# Patient Record
(demographics unavailable — no encounter records)

---

## 2024-10-01 NOTE — DISEASE MANAGEMENT
[Clinical] : TNM Stage: c [FreeTextEntry4] : cervical cancer, squamous cell [TTNM] : 1b3 [NTNM] : 0 [MTNM] : 0 [IB] : IB

## 2024-10-01 NOTE — HISTORY OF PRESENT ILLNESS
[FreeTextEntry1] : 72 year old woman returns today s/p 5,040cGy to the pelvis completed 12/5/23 and 2,800cGy T & R completed 12/6/23 with weekly Cisplatin for a [id number] lB SCCA of the cervix.  Interval history: She met with Caroline Abdul 10/2/24   vaginal pruritus, vaginal discharge, edema, change in bowel, change in urination, pelvic pain/cramping, breast pain, fatigue or weight loss.  Care team: Gyn Onc Lyndon Moyer Med Onc Natasha Ramirez: Anastrozole  PCP Kevin Humphreys Breast surgeon Caroline Abdul OR? med onc Rufus Lee

## 2024-10-25 NOTE — END OF VISIT
[FreeTextEntry3] : Follow up with Dr. Garcia for exam Follow up with pelvic exam in my office in 3 months and follow up PET CT Follow up with Dr. Ramirez for breast FDG activity work up [Time Spent: ___ minutes] : I have spent [unfilled] minutes of time on the encounter which excludes teaching and separately reported services.

## 2024-10-25 NOTE — VITALS
[Maximal Pain Intensity: 1/10] : 1/10 [Least Pain Intensity: 0/10] : 0/10 [Pain Description/Quality: ___] : Pain description/quality: [unfilled] [Pain Duration: ___] : Pain duration: [unfilled] [Pain Location: ___] : Pain Location: [unfilled] [Pain Interferes with ADLs] : Pain does not interfere with activities of daily living [80: Normal activity with effort; some signs or symptoms of disease.] : 80: Normal activity with effort; some signs or symptoms of disease.

## 2024-10-25 NOTE — REVIEW OF SYSTEMS
[Negative] : Allergic/Immunologic [Constipation: Grade 0] : Constipation: Grade 0 [Diarrhea: Grade 0] : Diarrhea: Grade 0 [Edema Limbs: Grade 0] : Edema Limbs: Grade 0  [Fatigue: Grade 0] : Fatigue: Grade 0 [Localized Edema: Grade 0] : Localized Edema: Grade 0  [Neck Edema: Grade 0] : Neck Edema: Grade 0 [Hematuria: Grade 0] : Hematuria: Grade 0 [Urinary Incontinence: Grade 1 - Occasional (e.g., with coughing, sneezing, etc.), pads not indicated] : Urinary Incontinence: Grade 1 - Occasional (e.g., with coughing, sneezing, etc.), pads not indicated [Urinary Retention: Grade 0] : Urinary Retention: Grade 0 [Urinary Tract Pain: Grade 0] : Urinary Tract Pain: Grade 0 [Urinary Urgency: Grade 0] : Urinary Urgency: Grade 0 [Urinary Frequency: Grade 0] : Urinary Frequency: Grade 0

## 2024-10-25 NOTE — HISTORY OF PRESENT ILLNESS
[FreeTextEntry1] : 72-year-old woman with cervical cancer and breast cancer.  She underwent definitive chemoradiation for cervical cancer, with EBRT plus intracavitary brachytherapy, completed 12/6/2023.  Started neoadjuvant endocrine therapy for breast cancer in 10/2023.  Interval history: 8/22/2024 left breast ultrasound showed a 1.7 cm irregular mass in the left breast 3:00 1 cm FN, and a 2.8 cm abnormal appearing axillary lymph node.  No significant sonographic change in both these findings compared to before.    10/11/2024 PET/CT scan showed focal uptake at the left aspect of the cervix (SUV 3.7, previously 4.0), with no uterine masses or hypermetabolic abnormalities.  No abdominal pelvic lymph nodes.  Unchanged left axillary/breast soft tissue density with mild uptake.    She has noted some urinary stress incontinence, but otherwise stable health.  No vaginal pruritus.  She is using a vaginal dilator, and notes occasional spotting after dilator use.  Care team: Gyn Onc Lyndon Moyer Med Onc Natasha Ramirez PCP Kevin Humphreys Breast surgeon Caroline Abdul

## 2024-10-25 NOTE — ASSESSMENT
[FreeTextEntry1] : Pt is a 73 yo with Stage Ib3 squamous cell carcinoma s/p chemo-radiation treatment. Patient is s/p PEUA Cervical biopsy and ECC on 8/28/23. PET CT on 10/11/24 with stable findings with FDG activity of 3.7 in left part of the cervix clinically patient asymptomatic and relatively decreased activity compared to April and June study. I do not think that MRI follow up is required at this time and this is consistent with inflammation causing this activity. If there is increase FDG activity or any new symptoms would consider repeat MRI, but for now just continue PET CT follow up.  She also needs to follow up with Dr. Ramirez for breast work up.   She has follow up with Dr. Garcia today to make sure he agrees with plan. Continue surveillance visits and pelvic exams every 3 months.

## 2024-10-25 NOTE — HISTORY OF PRESENT ILLNESS
[Home] : at home, [unfilled] , at the time of the visit. [Other Location: e.g. Home (Enter Location, City,State)___] : at [unfilled] [Verbal consent obtained from patient] : the patient, [unfilled] [FreeTextEntry1] : Pt is a 73 yo with Stage Ib3 squamous cell carcinoma s/p chemo-radiation treatment. Patient is s/p PEUA Cervical biopsy and ECC on 8/28/23. Pt had completed treatment and had residual FDG activity in cervix 4/2024 of 4.2. Follow up MRI without focal lesions at that time and had stable activity in the area on 3 month follow up as recommended by TB. She had last PET CT done 10/11/2024 and presents to review and discuss.   Indication: transportation issue  Interval history: Patient reports no vaginal bleeding, no pelvic pain and no new symptoms she remains asymptomatic.

## 2024-10-25 NOTE — PHYSICAL EXAM
[Normal] : oriented to person, place and time, the affect was normal, the mood was normal and not anxious [Extraocular Movements] : extraocular movements were intact [Outer Ear] : the ears and nose were normal in appearance [Normal] : normal external genitalia [de-identified] : Examination performed in the presence of a female chaperone.  Treatment related changes, with narrowing of the upper vagina.  No mass lesions appreciable.  No tenderness to palpation.

## 2024-10-25 NOTE — PHYSICAL EXAM
[Normal] : oriented to person, place and time, the affect was normal, the mood was normal and not anxious [Extraocular Movements] : extraocular movements were intact [Outer Ear] : the ears and nose were normal in appearance [Normal] : normal external genitalia [de-identified] : Examination performed in the presence of a female chaperone.  Treatment related changes, with narrowing of the upper vagina.  No mass lesions appreciable.  No tenderness to palpation.

## 2024-11-13 NOTE — HISTORY OF PRESENT ILLNESS
[FreeTextEntry1] : Pt is a 71 y/o F who presents today for initial evaluation.  Pt has PMH CAD s/p PCI RCA 2004 at [facility] (arm pain, DERAS), HLD, HTN, breast Ca dx 07/2023 cervical Ca s/p cisplatin (but could not tolerate) and RXT  Pt is planning to have lumpectomy at [place] Dr Abdul She is feeling well overall - denies CP, SOB, diaphoresis, palpitations, dizziness, syncope, LE edema, PND, orthopnea.  Pt is accompanied by her    PCP Dr Kevin Rosa Previous surgeries: carpal tunnel - no problems with anesthesia Smoking status: quit 2014 no ETOH no drug use Current exercise: none Daily water intake: 50 oz Daily caffeine intake: 1 cup coffee OTC medications: vit D

## 2024-11-13 NOTE — DISCUSSION/SUMMARY
[EKG obtained to assist in diagnosis and management of assessed problem(s)] : EKG obtained to assist in diagnosis and management of assessed problem(s) [FreeTextEntry1] : Pt is a 71 y/o F who presents today for initial evaluation.  Pt has PMH CAD s/p PCI RCA 2004 at [facility] (arm pain, DERAS), HLD, HTN, breast Ca dx 07/2023 cervical Ca s/p cisplatin (but could not tolerate) and RXT  Pt is planning to have lumpectomy at [place] Dr Abdul  Will check transthoracic echocardiogram to evaluate left ventricular function and assess for any structural abnormalities  check nuc stress test to eval for ischemia Further preop recommendations will be made once diagnostic testing is complete.   CAD: pt asymptomatic unknown LV function Will check transthoracic echocardiogram to evaluate left ventricular function and assess for any structural abnormalities  c/w ASA 81mg qd c/w statin, goal LDL <70 check labs We discussed the importance of aggressive risk factor modification, including continuing medications as directed, following a healthy diet of unprocessed, low saturated fat and carbohydrate diet as close to a plant based or Mediterranean as possible, regular exercise at least 30 minutes of cardiovascular exercise daily. Also advised to report any symptoms immediately.  HTN: well controlled c/w atenolol 50mg qd Discussed the long-term health risks of uncontrolled BP.  Advised low salt diet, regular exercise, maintaining ideal weight. Encouraged pt to check BP at home and keep journal   Pt will return in 6 mnths or sooner as needed but I encouraged communication via phone or portal if necessary.  We will call pt with test results when applicable and arrange for expedited follow up if necessary.  The described plan was discussed with the pt.  All questions and concerns were addressed to the best of my knowledge.

## 2024-11-13 NOTE — PHYSICAL EXAM
LM for patient informing her that her urine is positive for infection.  Per Leigh GONZALEZ, please treat with macrobid 100mg po bid x 10 days starting tomorrow (we want her last day to be 5/17 when she has her urodynamics as rescheduled).  Script sent to [facility] pharmacy. Told patient to call office back to confirm that she received our message.       [Well Developed] : well developed [Well Nourished] : well nourished [No Acute Distress] : no acute distress [Normal Conjunctiva] : normal conjunctiva [Normal Venous Pressure] : normal venous pressure [No Carotid Bruit] : no carotid bruit [Normal S1, S2] : normal S1, S2 [No Murmur] : no murmur [No Rub] : no rub [No Gallop] : no gallop [Clear Lung Fields] : clear lung fields [Good Air Entry] : good air entry [No Respiratory Distress] : no respiratory distress  [Soft] : abdomen soft [Non Tender] : non-tender [No Masses/organomegaly] : no masses/organomegaly [Normal Bowel Sounds] : normal bowel sounds [Normal Gait] : normal gait [No Edema] : no edema [No Cyanosis] : no cyanosis [No Clubbing] : no clubbing [No Varicosities] : no varicosities [No Rash] : no rash [No Skin Lesions] : no skin lesions [Moves all extremities] : moves all extremities [No Focal Deficits] : no focal deficits [Normal Speech] : normal speech [Alert and Oriented] : alert and oriented [Normal memory] : normal memory

## 2024-11-19 NOTE — CONSULT LETTER
[Dear  ___] : Dear  [unfilled], [Consult Letter:] : I had the pleasure of evaluating your patient, [unfilled]. [Please see my note below.] : Please see my note below. [Consult Closing:] : Thank you very much for allowing me to participate in the care of this patient.  If you have any questions, please do not hesitate to contact me. [Sincerely,] : Sincerely, [FreeTextEntry3] : Natasha Ramirez MD Medical Oncology/Hematology [facility] [place], [facility]   [facility] [employer] [facility]

## 2024-11-19 NOTE — HISTORY OF PRESENT ILLNESS
[de-identified] : Ms. Vega is a 72 year old female PMH cervical cancer, currently undergoing concurrent chemotherapy with weekly Cisplatin and RT, who is diagnosed with a synchronous left breast invasive lobular carcinoma ER 95% LA 05% HER2 0 Negative and DCIS with metastatic left axillary invasive carcinoma consistent with lobular carcinoma. cT1N1.   Patient obtained staging PET/CT on 9/20/23 for cervical cancer  - demonstrating Diffusely increased activity in the prominent cervix compatible with biopsied malignancy. No hypermetabolic pelvic sidewall or retroperitoneal nodes. Prominent elongated left axillary node measures 3.8 x 1.1 cm with SUV 3.   09/29/2023 Diagnostic bilateral mammo and left breast sono- There are scattered areas of fibroglandular density. No suspicious mass, suspicious microcalcifications, or other sign of malignancy is identified in the right breast.Indeterminate asymmetry is present in the lateral anterior left breast.A prominent left axillary lymph node is noted. US-The breast parenchyma demonstrates a heterogeneous background echotexture (mixed fatty and fibroglandular). Impression- Suspicious left breast finding at 3:00, 1 cm from the nipple, as well as the left axilla, ultrasound-guided biopsy of both is recommended.  10/9/23 Underwent left breast and left axillary US guided core biopsy  1. Left breast 3:00 1cmfn US guided core biopsy- Invasive lobular carcinoma, ER 95% LA 05% HER2 0 Negative, classic type, measure 14mm, lobular carcinoma in situ and DCIS, microcalficiatons are present in LCIS, lymphovascular permeation by  tumor is not seen 2. Left axillary lymph node US guided core biopsy- Invasive carcinoma consistent with lobular carcinoma metastatic lymph node, measures 14mm, extracapsular extension is not present     Patient presents for initial visit.  Notes receiving 2 cycle's of Cisplatin, notes Cisplatin this week was cancelled due to elevated Creatinine, s/p [facility] admission for 2 days for hydration, pending follow up for further planning with Dr Lee  Undergoing RT for cervical cancer Has left breast pain with bruising and left nipple retraction since breast biopsy.  Notes never obtaining mammogram prior to 9/2023  [de-identified] : Pt presents for follow up with  Pt reports doing well overall Pending lumpectomy, has cards clearance pending

## 2024-11-19 NOTE — BEGINNING OF VISIT
[Medical reason not done] : Medical reason not done [0] : 2) Feeling down, depressed, or hopeless: Not at all (0) [Former] : Former [Date Discussed (MM/DD/YY): ___] : Discussed: [unfilled] [Patient/Caregiver not ready to engage] : Patient/Caregiver not ready to engage [[id number]] : 0 [Pain Scale: ___] : On a scale of 1-10, today the patient's pain is a(n) [unfilled].

## 2024-11-19 NOTE — CONSULT LETTER
[Dear  ___] : Dear  [unfilled], [Consult Letter:] : I had the pleasure of evaluating your patient, [unfilled]. [Please see my note below.] : Please see my note below. [Consult Closing:] : Thank you very much for allowing me to participate in the care of this patient.  If you have any questions, please do not hesitate to contact me. [Sincerely,] : Sincerely, [FreeTextEntry3] : Natasha Ramirez MD Medical Oncology/Hematology [facility] [place], [facility]   [facility] [employer] [facility]

## 2024-11-19 NOTE — ADDENDUM
[FreeTextEntry1] : Documented by Isabel Diaz acting as scribe for Dr. Ramirez on  11/19/2024.    All Medical record entries made by the Scribe were at my, Dr. Ramirez's, direction and personally dictated by me on  11/19/2024. I have reviewed the chart and agree that the record accurately reflects my personal performance of the history, physical exam, assessment and plan. I have also personally directed, reviewed, and agreed with the discharge instructions.

## 2024-11-19 NOTE — ASSESSMENT
[FreeTextEntry1] : 72 year old female with synchronous cervical and breast cancer. She was undergoing treatment for stage IB squamous cell cervical cancer, CRT with weekly Cisplatin, diagnosed synchronous left breast invasive lobular carcinoma ER 95% WA 05% HER2 0 Negative metastatic to left axillary LN. cT1N1.  As she was undergoing treatment for cervical cancer, she started neoadjuvant endocrine therapy for breast cancer. Started Anastrozole 1 mg daily on 11/17/23. She completed RT for cervical cancer on 12/6/23. Oncotype Dx RS 2, no benefit from chemotherapy, and DRR 9% at 9 years with Lama or AI.   11/28/23 MRI breast -  irregular nonmass enhancement in the 3:00 axis spanning for up to 4.2 cm in AP dimension x 1.3 cm transverse, with associated biopsy marker, corresponding to newly diagnosed malignancy Enhancement extends into the nipple which demonstrates asymmetric enhancement. 2.8 cm enlarged left axillary lymph node with biopsy marker, biopsy-proven malignancy.  Case discussed at tumor board 2/28/24. 3/2024 US breast and PET with interval response in left axillary lymph node with similar appearance of left breast mass. Continue endocrine therapy. Patient desires breast conservation.  9/23/24 DEXA: Osteopenia 11/8/24 MR Breast - Previously visualized left breast enhancement consistent with previously diagnosed malignancy is no longer definitively seen as compared to 11/28/2023 exam. Previously identified left breast satellite lesion central posterior location no longer identified. Findings are consistent with almost complete resolution by imaging. No new areas of suspicious enhancement.   Plan:  Continue anastrozole.  Pending left lumpectomy with Dr. Abdul Next DEXA 9/2026 Continue vitamin D3 2000 IU daily RTO 4 weeks after surgery

## 2024-11-19 NOTE — HISTORY OF PRESENT ILLNESS
[de-identified] : Ms. Vega is a 72 year old female PMH cervical cancer, currently undergoing concurrent chemotherapy with weekly Cisplatin and RT, who is diagnosed with a synchronous left breast invasive lobular carcinoma ER 95% WA 05% HER2 0 Negative and DCIS with metastatic left axillary invasive carcinoma consistent with lobular carcinoma. cT1N1.   Patient obtained staging PET/CT on 9/20/23 for cervical cancer  - demonstrating Diffusely increased activity in the prominent cervix compatible with biopsied malignancy. No hypermetabolic pelvic sidewall or retroperitoneal nodes. Prominent elongated left axillary node measures 3.8 x 1.1 cm with SUV 3.   09/29/2023 Diagnostic bilateral mammo and left breast sono- There are scattered areas of fibroglandular density. No suspicious mass, suspicious microcalcifications, or other sign of malignancy is identified in the right breast.Indeterminate asymmetry is present in the lateral anterior left breast.A prominent left axillary lymph node is noted. US-The breast parenchyma demonstrates a heterogeneous background echotexture (mixed fatty and fibroglandular). Impression- Suspicious left breast finding at 3:00, 1 cm from the nipple, as well as the left axilla, ultrasound-guided biopsy of both is recommended.  10/9/23 Underwent left breast and left axillary US guided core biopsy  1. Left breast 3:00 1cmfn US guided core biopsy- Invasive lobular carcinoma, ER 95% WA 05% HER2 0 Negative, classic type, measure 14mm, lobular carcinoma in situ and DCIS, microcalficiatons are present in LCIS, lymphovascular permeation by  tumor is not seen 2. Left axillary lymph node US guided core biopsy- Invasive carcinoma consistent with lobular carcinoma metastatic lymph node, measures 14mm, extracapsular extension is not present     Patient presents for initial visit.  Notes receiving 2 cycle's of Cisplatin, notes Cisplatin this week was cancelled due to elevated Creatinine, s/p [facility] admission for 2 days for hydration, pending follow up for further planning with Dr Lee  Undergoing RT for cervical cancer Has left breast pain with bruising and left nipple retraction since breast biopsy.  Notes never obtaining mammogram prior to 9/2023  [de-identified] : Pt presents for follow up with  Pt reports doing well overall Pending lumpectomy, has cards clearance pending

## 2024-11-19 NOTE — BEGINNING OF VISIT
[Medical reason not done] : Medical reason not done [0] : 2) Feeling down, depressed, or hopeless: Not at all (0) [Former] : Former [Date Discussed (MM/DD/YY): ___] : Discussed: [unfilled] [Patient/Caregiver not ready to engage] : Patient/Caregiver not ready to engage [[id number]] : 0 [Pain Scale: ___] : On a scale of 1-10, today the patient's pain is a(n) [unfilled].

## 2024-11-19 NOTE — ASSESSMENT
[FreeTextEntry1] : 72 year old female with synchronous cervical and breast cancer. She was undergoing treatment for stage IB squamous cell cervical cancer, CRT with weekly Cisplatin, diagnosed synchronous left breast invasive lobular carcinoma ER 95% MD 05% HER2 0 Negative metastatic to left axillary LN. cT1N1.  As she was undergoing treatment for cervical cancer, she started neoadjuvant endocrine therapy for breast cancer. Started Anastrozole 1 mg daily on 11/17/23. She completed RT for cervical cancer on 12/6/23. Oncotype Dx RS 2, no benefit from chemotherapy, and DRR 9% at 9 years with Lama or AI.   11/28/23 MRI breast -  irregular nonmass enhancement in the 3:00 axis spanning for up to 4.2 cm in AP dimension x 1.3 cm transverse, with associated biopsy marker, corresponding to newly diagnosed malignancy Enhancement extends into the nipple which demonstrates asymmetric enhancement. 2.8 cm enlarged left axillary lymph node with biopsy marker, biopsy-proven malignancy.  Case discussed at tumor board 2/28/24. 3/2024 US breast and PET with interval response in left axillary lymph node with similar appearance of left breast mass. Continue endocrine therapy. Patient desires breast conservation.  9/23/24 DEXA: Osteopenia 11/8/24 MR Breast - Previously visualized left breast enhancement consistent with previously diagnosed malignancy is no longer definitively seen as compared to 11/28/2023 exam. Previously identified left breast satellite lesion central posterior location no longer identified. Findings are consistent with almost complete resolution by imaging. No new areas of suspicious enhancement.   Plan:  Continue anastrozole.  Pending left lumpectomy with Dr. Abdul Next DEXA 9/2026 Continue vitamin D3 2000 IU daily RTO 4 weeks after surgery

## 2025-01-17 NOTE — PAST MEDICAL HISTORY
[Menarche Age ____] : age at menarche was [unfilled] [Menopause Age____] : age at menopause was [unfilled] [Total Preg ___] : G[unfilled] [Age At Live Birth ___] : Age at live birth: [unfilled]

## 2025-01-21 NOTE — PHYSICAL EXAM
[Normocephalic] : normocephalic [Atraumatic] : atraumatic [EOMI] : extra ocular movement intact [PERRL] : pupils equal, round and reactive to light [Sclera nonicteric] : sclera nonicteric [Supple] : supple [No Supraclavicular Adenopathy] : no supraclavicular adenopathy [No Cervical Adenopathy] : no cervical adenopathy [Examined in the supine and seated position] : examined in the supine and seated position [Asymmetrical] : asymmetrical [Bra Size: ___] : Bra Size: [unfilled] [Grade 2] : Ptosis Grade 2 [No dominant masses] : no dominant masses in right breast  [No Nipple Discharge] : no left nipple discharge [No Edema] : no edema [No dominant masses] : no dominant masses left breast [No Nipple Retraction] : no left nipple retraction [No Axillary Lymphadenopathy] : no left axillary lymphadenopathy [de-identified] : R>L [de-identified] : Mitzy-areolar incision is well-approximated. There is mild soft tissue swelling/ecchymosis. No erythema or drainage.  [de-identified] : Axillary incision is well-approximated. There is a mild, palpable seroma without overlying skin compromise. No erythema or drainage.

## 2025-01-21 NOTE — ASSESSMENT
[FreeTextEntry1] : Patient is a 72 year old female with  left breast 3:00 cT1cN1 lobular carcinoma, with DCIS and LCIS, ER/[place] +, Her 2 negative. Underwent left avery PM, TAD, SLNB, final path revealing 18mm of residual disease, margins negative but close medial margin, 1mm; right axillary lymph node with known metastatic dz demonstrating partial treatment effect, 27mm and ROSA.   We reviewed post-op expectations and recovery. I explained to her that she is healing well without concern. We discussed seroma drainage of the axilla including risks and benefits. She would like to observe for now but will come in if she decides to have it drained.   We reviewed her pathology in detail and discussed the residual cancer in her breast and lymph node. We discussed management options including completion axillary dissection vs moving forward with radiation and systemic therapy, which is what I recommend, however, I did encourage her to followup with Dr. Garcia and Dr. Ramirez to discuss further. If either of them feel ALND would change her management we can move forward with that option.   Referral was given today for lymphedema surveillance with Dr. Nick. Next SOZO measurement in 3 months.   She is overdue for her right mammogram, however, she would prefer to have this done in 6 months, at the time of her left new baseline mammogram. I will see her back in 6 months for next CBE.   Patient verbalized understanding for all treatment plans discussed. All of her questions were answered to the best of my ability. She was encouraged to call the office if any questions or concerns or come in sooner if needed.   Plan: 1. PMR/lymphedema referral 2. Followup med onc, rad onc 3. Followup in 6 months 4. New baseline mammogram/US in 6 months

## 2025-01-21 NOTE — ASSESSMENT
[FreeTextEntry1] : Patient is a 72 year old female with  left breast 3:00 cT1cN1 lobular carcinoma, with DCIS and LCIS, ER/[place] +, Her 2 negative. Underwent left avery PM, TAD, SLNB, final path revealing 18mm of residual disease, margins negative but close medial margin, 1mm; right axillary lymph node with known metastatic dz demonstrating partial treatment effect, 27mm and ROSA.   We reviewed post-op expectations and recovery. I explained to her that she is healing well without concern. We discussed seroma drainage of the axilla including risks and benefits. She would like to observe for now but will come in if she decides to have it drained.   We reviewed her pathology in detail and discussed the residual cancer in her breast and lymph node. We discussed management options including completion axillary dissection vs moving forward with radiation and systemic therapy, which is what I recommend, however, I did encourage her to followup with Dr. Garcia and Dr. Ramirez to discuss further. If either of them feel ALND would change her management we can move forward with that option.   Referral was given today for lymphedema surveillance with Dr. Nick. Next SOZO measurement in 3 months.   She is overdue for her right mammogram, however, she would prefer to have this done in 6 months, at the time of her left new baseline mammogram. I will see her back in 6 months for next CBE.   Patient verbalized understanding for all treatment plans discussed. All of her questions were answered to the best of my ability. She was encouraged to call the office if any questions or concerns or come in sooner if needed.   Plan: 1. PMR/lymphedema referral 2. Followup med onc, rad onc 3. Followup in 6 months 4. New baseline mammogram/US in 6 months

## 2025-01-21 NOTE — HISTORY OF PRESENT ILLNESS
[FreeTextEntry1] : Oncology List:  1. Left breast 3:00 1cm FN cT1cN1 --> [id number] Invasive lobular carcinoma with DCIS and LCIS, ER/[place] +, Her 2 negative      -US 1.5 x 1.2 x 0.8 cm --> 1.6 X 0.8 X 0.9 cm (on recent US), biopsy 1.4 cm       -prominent left lymph node 3.1 x 1.4 x 2.9 cm - metastatic carcinoma --> 3. 1 x 0. 8 x 2.0 cm (on recent US) CONCORDANT        -Anastrozole started        -Oncotype DX RS 2     -Case discussed at TB 24 --> Continue Endocrine therapy for possibility at downstaging, patient desires BCT      -Restaging US demonstrating stable left breast mass and slightly decreased axillary lymph node      -Left avery PM, left TAD, SLNB 1/10/25          FINAL PATH: ILC measures 18mm, margins negative, 1mm from medial margin, 1/5 LN + for metastatic carcinoma, 27mm with ROSA, partial treatment effect in the breast and lymph node  2. Stage 1B Squamous cell cervical cancer     -completed radiation 23       -weekly cisplatin   Care Team:  Rudy - James Mendoza - Radha Gyn onc-[facility] Gyn med onc-Rosa BHATIA- Petr  Interval History:  (24): Patient presents for follow up. She remains on Endocrine therapy and tolerating well. She does not notice any changes to her breast, but did not appreciate the mass initially.   (10/02/24): Patient presents for follow up. She has been on Endocrine therapy for about 10 months. She does not notice any changes to her breast. She still desires BCT if possible.   (25): Patient presents for post op followup. Reports soreness, mostly in her axilla. Also noticed increased swelling in the axilla since .    HPI: Patient is a 71-year-old female presenting for initial consultation on 23 for newly diagnosed left breast cancer. Patient had a PET scan on 23 after recent diagnosis of cervical cancer, on PET scan mild activity seen in the left breast with a prominent elongated left axillary node. Patient called back for diagnostic mammogram bilateral and ultrasound limited left on 23 that demonstrated an irregular mass at 3:00 1 cm FN 1. 5 x 0.x 7 x 1. 4 cm and a prominent atypical lymph node measuring 3. 1 x 1.4 x 2.9 cm. Ultrasound guided biopsy of left breast on 10/09/23 demonstrated invasive lobular carcinoma, classic type, measuring at least 14 mm, with LCIS and DCIS identified, ER/[place] +, Her 2 negative. Ultrasound guided biopsy of lymph node consistent with lobular carcinoma metastatic to lymph nodes. She is currently undergoing treatment for cervical cancer with chemo/radiation.   BREAST HISTORY: This was patient's first mammogram. No prior breast history, no breast complaints. No family history of breast cancer.   NKDA, no latex allergy On aspirin, no AC h/o cardiac stent 2004, HL, thyroid dz  [facility] - Diffusely increased activity in the prominent cervix compatible with biopsied malignancy. No hypermetabolic pelvic sidewall or retroperitoneal nodes. Prominent elongated left axillary node with mild activity may reflect a reactive process. Slightly more prominent left breast parenchymal tissue laterally with mild activity is nonspecific but should be correlated with recent mammography.   [facility] Bilateral and Ultrasound Breast Limited Left: Density B, Impression - No suspicious mass, microcalcifications or other sign of malignancy is identified in the right breast. Indeterminate asymmetry is present in the lateral anterior left breast. US - At 3:00 1 cm FN a 1. 5 x 0. 7 x 1.4 cm irregular mass of mixed echogenicity is identified with unsharp margins. This correlates with the mammogram. Ultrasound guided biopsy is recommended. In the left axilla a prominent atypical appearing lymph node is identified measuring 3. 1 x 1. 4 x 2.9 cm, ultrasound guided biopsy of this finding is also recommended. BI-RADS 4B   [facility] Gu[facility] Biopsy Left Breast: Wing clip, Impression - Invasive lobular carcionma, classic type. Invasive tumor measures at least 14 mm. Lobular carcinoma in situ and ductal carcinoma in situ are identified. microcalcififcations are present in LCIS, ER/[place] +, Her 2 negative, CONCORDANT   [facility] Lymph Node: Twirl clip, Impression - consistent with lobular carcinoma metastatic to lymph nodes, CONCORDANT   24: [facility]: Left Breast Ultrasound Limited: Impression - 3:00 1 cm FN 1. 6 x 0.8 x 0.9 cm irregular shaped hypoechoic mass not significantly changed from 10/9/2023 where the mass measured 1. 5 x 1. 2 x 0.8 cm. Left axillary lymph node 3. 1 x 0.8 x 2.0 cm at site of biopsy proven metastatic carcinoma decreased in size from prior study 10/09/23 where the lymph node measured 3. 1 x 1. 6 x 3.0 cm. BI-RADS 6   24: [facility]: PET Scan: Impression - Compared to PET/CT 23 there has been interval response to therapy in the primary cervical cancer. Residual FDG avidity is evident in two areas, which can be further evaluated on MR to evaluate for residual disease versus attention on short term interval follow up PET/CT as clinically indicated. No FDG avid lymphadenopathy. Similar mild FDG avidity in the soft tissue around the biopsy clip in the left breast. Interval decreased in size and FDG avidity of a left axillary lymph node suggesting interval response to therapy.   24: [facility]: PET scan: Impression - Revisualized foci at the region of the cervix is largely unchanged, stable posttreatment changes at the left breast.   24: [facility]: US Breast Limited Left: Impression - Known biopsy proven left breast malignancy and malignant left axillary lymph node, both without signfiicant sonographic change.   10/11/24: [facility]: PET Scan: Impression - Since prior FDG-PET/CT scan 2024: 1.  Persistent focus at the region of the LEFT cervix remains grossly stable. Consider pelvic MRI for further evaluation if this will change patient management. Other findings in this area are currently unremarkable. 2.  Stable posttreatment changes at LEFT breast. 3.  Otherwise, no new suspicious findings elsewhere. Attention to follow-up.  24: [facility]: MRI Breast: Impression - Previously visualized left breast enhancement consistent with previously diagnosed malignancy is no longer definitively seen as compared to 2023 exam. Previously identified left breast satellite lesion central posterior location no longer identified. Findings are consistent with almost complete resolution by imaging. No new areas of suspicious enhancement.BI-RADS 6

## 2025-01-21 NOTE — HISTORY OF PRESENT ILLNESS
[FreeTextEntry1] : Oncology List:  1. Left breast 3:00 1cm FN cT1cN1 --> [id number] Invasive lobular carcinoma with DCIS and LCIS, ER/[place] +, Her 2 negative      -US 1.5 x 1.2 x 0.8 cm --> 1.6 X 0.8 X 0.9 cm (on recent US), biopsy 1.4 cm       -prominent left lymph node 3.1 x 1.4 x 2.9 cm - metastatic carcinoma --> 3. 1 x 0. 8 x 2.0 cm (on recent US) CONCORDANT        -Anastrozole started        -Oncotype DX RS 2     -Case discussed at TB 24 --> Continue Endocrine therapy for possibility at downstaging, patient desires BCT      -Restaging US demonstrating stable left breast mass and slightly decreased axillary lymph node      -Left avery PM, left TAD, SLNB 1/10/25          FINAL PATH: ILC measures 18mm, margins negative, 1mm from medial margin, 1/5 LN + for metastatic carcinoma, 27mm with ROSA, partial treatment effect in the breast and lymph node  2. Stage 1B Squamous cell cervical cancer     -completed radiation 23       -weekly cisplatin   Care Team:  Rudy - James Mendoza - Radha Gyn onc-[facility] Gyn med onc-Rosa BHATIA- Petr  Interval History:  (24): Patient presents for follow up. She remains on Endocrine therapy and tolerating well. She does not notice any changes to her breast, but did not appreciate the mass initially.   (10/02/24): Patient presents for follow up. She has been on Endocrine therapy for about 10 months. She does not notice any changes to her breast. She still desires BCT if possible.   (25): Patient presents for post op followup. Reports soreness, mostly in her axilla. Also noticed increased swelling in the axilla since .    HPI: Patient is a 71-year-old female presenting for initial consultation on 23 for newly diagnosed left breast cancer. Patient had a PET scan on 23 after recent diagnosis of cervical cancer, on PET scan mild activity seen in the left breast with a prominent elongated left axillary node. Patient called back for diagnostic mammogram bilateral and ultrasound limited left on 23 that demonstrated an irregular mass at 3:00 1 cm FN 1. 5 x 0.x 7 x 1. 4 cm and a prominent atypical lymph node measuring 3. 1 x 1.4 x 2.9 cm. Ultrasound guided biopsy of left breast on 10/09/23 demonstrated invasive lobular carcinoma, classic type, measuring at least 14 mm, with LCIS and DCIS identified, ER/[place] +, Her 2 negative. Ultrasound guided biopsy of lymph node consistent with lobular carcinoma metastatic to lymph nodes. She is currently undergoing treatment for cervical cancer with chemo/radiation.   BREAST HISTORY: This was patient's first mammogram. No prior breast history, no breast complaints. No family history of breast cancer.   NKDA, no latex allergy On aspirin, no AC h/o cardiac stent 2004, HL, thyroid dz  [facility]: Impression - Diffusely increased activity in the prominent cervix compatible with biopsied malignancy. No hypermetabolic pelvic sidewall or retroperitoneal nodes. Prominent elongated left axillary node with mild activity may reflect a reactive process. Slightly more prominent left breast parenchymal tissue laterally with mild activity is nonspecific but should be correlated with recent mammography.   [facility]amm[facility] Bilateral and Ultrasound Breast Limited Left: Density B, Impression - No suspicious mass, microcalcifications or other sign of malignancy is identified in the right breast. Indeterminate asymmetry is present in the lateral anterior left breast. US - At 3:00 1 cm FN a 1. 5 x 0. 7 x 1.4 cm irregular mass of mixed echogenicity is identified with unsharp margins. This correlates with the mammogram. Ultrasound guided biopsy is recommended. In the left axilla a prominent atypical appearing lymph node is identified measuring 3. 1 x 1. 4 x 2.9 cm, ultrasound guided biopsy of this finding is also recommended. BI-RADS 4B   [facility] Guided Biopsy Left Breast: Wing clip, Impression - Invasive lobular carcionma, classic type. Invasive tumor measures at least 14 mm. Lobular carcinoma in situ and ductal carcinoma in situ are identified. microcalcififcations are present in LCIS, ER/DC +, Her 2 negative, CONCORDANT   [facility] Left Axilla Lymph Node: Twirl clip, Impression - consistent with lobular carcinoma metastatic to lymph nodes, CONCORDANT   24: [facility]: Left Breast Ultrasound Limited: Impression - 3:00 1 cm FN 1. 6 x 0.8 x 0.9 cm irregular shaped hypoechoic mass not significantly changed from 10/9/2023 where the mass measured 1. 5 x 1. 2 x 0.8 cm. Left axillary lymph node 3. 1 x 0.8 x 2.0 cm at site of biopsy proven metastatic carcinoma decreased in size from prior study 10/09/23 where the lymph node measured 3. 1 x 1. 6 x 3.0 cm. BI-RADS 6   24: [facility]: PET Scan: Impression - Compared to PET/CT 23 there has been interval response to therapy in the primary cervical cancer. Residual FDG avidity is evident in two areas, which can be further evaluated on MR to evaluate for residual disease versus attention on short term interval follow up PET/CT as clinically indicated. No FDG avid lymphadenopathy. Similar mild FDG avidity in the soft tissue around the biopsy clip in the left breast. Interval decreased in size and FDG avidity of a left axillary lymph node suggesting interval response to therapy.   24: [facility]: PET scan: Impression - Revisualized foci at the region of the cervix is largely unchanged, stable posttreatment changes at the left breast.   24: [facility]: US Breast Limited Left: Impression - Known biopsy proven left breast malignancy and malignant left axillary lymph node, both without signfiicant sonographic change.   10/11/24: [facility]: PET Scan: Impression - Since prior FDG-PET/CT scan 2024: 1.  Persistent focus at the region of the LEFT cervix remains grossly stable. Consider pelvic MRI for further evaluation if this will change patient management. Other findings in this area are currently unremarkable. 2.  Stable posttreatment changes at LEFT breast. 3.  Otherwise, no new suspicious findings elsewhere. Attention to follow-up.  24: [facility]: MRI Breast: Impression - Previously visualized left breast enhancement consistent with previously diagnosed malignancy is no longer definitively seen as compared to 2023 exam. Previously identified left breast satellite lesion central posterior location no longer identified. Findings are consistent with almost complete resolution by imaging. No new areas of suspicious enhancement.BI-RADS 6

## 2025-01-21 NOTE — ASSESSMENT
[FreeTextEntry1] : Patient is a 72 year old female with  left breast 3:00 cT1cN1 lobular carcinoma, with DCIS and LCIS, ER/[place] +, Her 2 negative. Underwent left avery PM, TAD, SLNB, final path revealing 18mm of residual disease, margins negative but close medial margin, 1mm; right axillary lymph node with known metastatic dz demonstrating partial treatment effect, 27mm and ROSA.   We reviewed post-op expectations and recovery. I explained to her that she is healing well without concern. We discussed seroma drainage of the axilla including risks and benefits. She would like to observe for now but will come in if she decides to have it drained.   We reviewed her pathology in detail and discussed the residual cancer in her breast and lymph node. We discussed management options including completion axillary dissection vs moving forward with radiation and systemic therapy, which is what I recommend, however, I did encourage her to followup with Dr. Garcia and Dr. Ramirez to discuss further. If either of them feel ALND would change her management we can move forward with that option.   Referral was given today for lymphedema surveillance with Dr. Nick. Next SOZO measurement in 3 months.   She is overdue for her right mammogram, however, she would prefer to have this done in 6 months, at the time of her left new baseline mammogram. I will see her back in 6 months for next CBE.   Patient verbalized understanding for all treatment plans discussed. All of her questions were answered to the best of my ability. She was encouraged to call the office if any questions or concerns or come in sooner if needed.   Plan: 1. PMR/lymphedema referral 2. Followup med onc, rad onc 3. Followup in 6 months 4. New baseline mammogram/US in 6 months

## 2025-01-21 NOTE — PHYSICAL EXAM
[Normocephalic] : normocephalic [Atraumatic] : atraumatic [EOMI] : extra ocular movement intact [PERRL] : pupils equal, round and reactive to light [Sclera nonicteric] : sclera nonicteric [Supple] : supple [No Supraclavicular Adenopathy] : no supraclavicular adenopathy [No Cervical Adenopathy] : no cervical adenopathy [Examined in the supine and seated position] : examined in the supine and seated position [Asymmetrical] : asymmetrical [Bra Size: ___] : Bra Size: [unfilled] [Grade 2] : Ptosis Grade 2 [No dominant masses] : no dominant masses in right breast  [No Nipple Discharge] : no left nipple discharge [No Edema] : no edema [No dominant masses] : no dominant masses left breast [No Nipple Retraction] : no left nipple retraction [No Axillary Lymphadenopathy] : no left axillary lymphadenopathy [de-identified] : R>L [de-identified] : Mitzy-areolar incision is well-approximated. There is mild soft tissue swelling/ecchymosis. No erythema or drainage.  [de-identified] : Axillary incision is well-approximated. There is a mild, palpable seroma without overlying skin compromise. No erythema or drainage.

## 2025-01-21 NOTE — PHYSICAL EXAM
[Normocephalic] : normocephalic [Atraumatic] : atraumatic [EOMI] : extra ocular movement intact [PERRL] : pupils equal, round and reactive to light [Sclera nonicteric] : sclera nonicteric [Supple] : supple [No Supraclavicular Adenopathy] : no supraclavicular adenopathy [No Cervical Adenopathy] : no cervical adenopathy [Examined in the supine and seated position] : examined in the supine and seated position [Asymmetrical] : asymmetrical [Bra Size: ___] : Bra Size: [unfilled] [Grade 2] : Ptosis Grade 2 [No dominant masses] : no dominant masses in right breast  [No Nipple Discharge] : no left nipple discharge [No Edema] : no edema [No dominant masses] : no dominant masses left breast [No Nipple Retraction] : no left nipple retraction [No Axillary Lymphadenopathy] : no left axillary lymphadenopathy [de-identified] : R>L [de-identified] : Mitzy-areolar incision is well-approximated. There is mild soft tissue swelling/ecchymosis. No erythema or drainage.  [de-identified] : Axillary incision is well-approximated. There is a mild, palpable seroma without overlying skin compromise. No erythema or drainage.

## 2025-01-21 NOTE — HISTORY OF PRESENT ILLNESS
[FreeTextEntry1] : Oncology List:  1. Left breast 3:00 1cm FN cT1cN1 --> [id number] Invasive lobular carcinoma with DCIS and LCIS, ER/UT +, Her 2 negative      -US 1.5 x 1.2 x 0.8 cm --> 1.6 X 0.8 X 0.9 cm (on recent US), biopsy 1.4 cm       -prominent left lymph node 3.1 x 1.4 x 2.9 cm - metastatic carcinoma --> 3. 1 x 0. 8 x 2.0 cm (on recent US) CONCORDANT        -Anastrozole started        -Oncotype DX RS 2     -Case discussed at TB 24 --> Continue Endocrine therapy for possibility at downstaging, patient desires BCT      -Restaging US demonstrating stable left breast mass and slightly decreased axillary lymph node      -Left avery PM, left TAD, SLNB 1/10/25          FINAL PATH: ILC measures 18mm, margins negative, 1mm from medial margin, 1/5 LN + for metastatic carcinoma, 27mm with ROSA, partial treatment effect in the breast and lymph node  2. Stage 1B Squamous cell cervical cancer     -completed radiation 23       -weekly cisplatin   Care Team:  Rudy - James Mendoza - Radha Gyn onc-[facility] Gyn med onc-Rosa BHATIA- Petr  Interval History:  (24): Patient presents for follow up. She remains on Endocrine therapy and tolerating well. She does not notice any changes to her breast, but did not appreciate the mass initially.   (10/02/24): Patient presents for follow up. She has been on Endocrine therapy for about 10 months. She does not notice any changes to her breast. She still desires BCT if possible.   (25): Patient presents for post op followup. Reports soreness, mostly in her axilla. Also noticed increased swelling in the axilla since .    HPI: Patient is a 71-year-old female presenting for initial consultation on 23 for newly diagnosed left breast cancer. Patient had a PET scan on 23 after recent diagnosis of cervical cancer, on PET scan mild activity seen in the left breast with a prominent elongated left axillary node. Patient called back for diagnostic mammogram bilateral and ultrasound limited left on 23 that demonstrated an irregular mass at 3:00 1 cm FN 1. 5 x 0.x 7 x 1. 4 cm and a prominent atypical lymph node measuring 3. 1 x 1.4 x 2.9 cm. Ultrasound guided biopsy of left breast on 10/09/23 demonstrated invasive lobular carcinoma, classic type, measuring at least 14 mm, with LCIS and DCIS identified, ER/UT +, Her 2 negative. Ultrasound guided biopsy of lymph node consistent with lobular carcinoma metastatic to lymph nodes. She is currently undergoing treatment for cervical cancer with chemo/radiation.   BREAST HISTORY: This was patient's first mammogram. No prior breast history, no breast complaints. No family history of breast cancer.   NKDA, no latex allergy On aspirin, no AC h/o cardiac stent 2004, HL, thyroid dz  [facility]: Impression - Diffusely increased activity in the prominent cervix compatible with biopsied malignancy. No hypermetabolic pelvic sidewall or retroperitoneal nodes. Prominent elongated left axillary node with mild activity may reflect a reactive process. Slightly more prominent left breast parenchymal tissue laterally with mild activity is nonspecific but should be correlated with recent mammography.   [facility]amm[facility] Bilateral and Ultrasound Breast Limited Left: Density B, Impression - No suspicious mass, microcalcifications or other sign of malignancy is identified in the right breast. Indeterminate asymmetry is present in the lateral anterior left breast. US - At 3:00 1 cm FN a 1. 5 x 0. 7 x 1.4 cm irregular mass of mixed echogenicity is identified with unsharp margins. This correlates with the mammogram. Ultrasound guided biopsy is recommended. In the left axilla a prominent atypical appearing lymph node is identified measuring 3. 1 x 1. 4 x 2.9 cm, ultrasound guided biopsy of this finding is also recommended. BI-RADS 4B   [facility] Guided Biopsy Left Breast: Wing clip, Impression - Invasive lobular carcionma, classic type. Invasive tumor measures at least 14 mm. Lobular carcinoma in situ and ductal carcinoma in situ are identified. microcalcififcations are present in LCIS, ER/UT +, Her 2 negative, CONCORDANT   [facility] Axilla Lymph Node: Twirl clip, Impression - consistent with lobular carcinoma metastatic to lymph nodes, CONCORDANT   24: [facility]: Left Breast Ultrasound Limited: Impression - 3:00 1 cm FN 1. 6 x 0.8 x 0.9 cm irregular shaped hypoechoic mass not significantly changed from 10/9/2023 where the mass measured 1. 5 x 1. 2 x 0.8 cm. Left axillary lymph node 3. 1 x 0.8 x 2.0 cm at site of biopsy proven metastatic carcinoma decreased in size from prior study 10/09/23 where the lymph node measured 3. 1 x 1. 6 x 3.0 cm. BI-RADS 6   24: [facility]: PET Scan: Impression - Compared to PET/CT 23 there has been interval response to therapy in the primary cervical cancer. Residual FDG avidity is evident in two areas, which can be further evaluated on MR to evaluate for residual disease versus attention on short term interval follow up PET/CT as clinically indicated. No FDG avid lymphadenopathy. Similar mild FDG avidity in the soft tissue around the biopsy clip in the left breast. Interval decreased in size and FDG avidity of a left axillary lymph node suggesting interval response to therapy.   24: [facility]: PET scan: Impression - Revisualized foci at the region of the cervix is largely unchanged, stable posttreatment changes at the left breast.   24: [facility]: US Breast Limited Left: Impression - Known biopsy proven left breast malignancy and malignant left axillary lymph node, both without signfiicant sonographic change.   10/11/24: [facility]: PET Scan: Impression - Since prior FDG-PET/CT scan 2024: 1.  Persistent focus at the region of the LEFT cervix remains grossly stable. Consider pelvic MRI for further evaluation if this will change patient management. Other findings in this area are currently unremarkable. 2.  Stable posttreatment changes at LEFT breast. 3.  Otherwise, no new suspicious findings elsewhere. Attention to follow-up.  24: [facility]: MRI Breast: Impression - Previously visualized left breast enhancement consistent with previously diagnosed malignancy is no longer definitively seen as compared to 2023 exam. Previously identified left breast satellite lesion central posterior location no longer identified. Findings are consistent with almost complete resolution by imaging. No new areas of suspicious enhancement.BI-RADS 6

## 2025-01-27 NOTE — HISTORY OF PRESENT ILLNESS
[FreeTextEntry1] : 72 year old woman  with cervical cancer and breast cancer. She underwent definitive chemoradiation for cervical cancer, with EBRT plus intracavitary brachytherapy, completed 12/6/2023. Started neoadjuvant endocrine therapy for breast cancer in 10/2023.  Interval history: 1/10/25 Diagnosis A. Left axillary lymph node, excision: *   Lymph node with complete effacement by metastatic carcinoma with extensive fibrosis compatible with partial treatment artifact. B. Left axillary sentinel lymph node #1, excision: *   Two lymph nodes, negative for metastatic carcinoma (0/2). *   Immunohistochemical analysis supports the diagnosis. C. Left axillary sentinel lymph node #2, excision: *   One lymph node, negative for metastatic carcinoma (0/1). *   Immunohistochemical analysis supports the diagnosis.  D. Left breast, 3:00, needle localized partial mastectomy: *   Invasive lobular carcinoma with changes compatible with partial treatment effect. *  Grade: 2. *  Tubule formation: 3. *  Nuclear Grade: 2. *  Mitotic Rate: 1. *  Size: 18.0 mm. *   Lobular carcinoma in situ. *   Atypical lobular hyperplasia. *   Atypical ductal hyperplasia. *   Fibrocystic changes including usual ductal hyperplasia, columnar cell change, adenosis and apocrine metaplasia. *   Biopsy site changes. E. Left breast, new posterior margin, excision: *   Benign breast tissue. F. Left breast, new lateral margin, excision: *   Benign breast tissue. G. Left breast, new superior margin, excision: *   Lobular carcinoma in situ. H. Left breast, new inferior margin, excision: *   Fibrocystic changes including adenosis, stromal fibrosis and associated microcalcifications.  I. Left breast, new medial margin, excision: *   Invasive lobular carcinoma involving the cauterized margin. J. Left breast, final medial margin, excision: *   Invasive lobular carcinoma measuring 1.0 mm from the cauterized margin.  Based on the immunostain result, the final margin is negative with the invasive carcinoma measuring 2.0 mm from the inked margin.  The cauterized margin does not represent the true final margin as per Dr. Abdul.  The original diagnosis remains unchanged.  Estrogen Receptor (ER) Status:   Positive (greater than 10% of cells demonstrate nuclear positivity) Percentage of Cells with Nuclear Positivity:    71-80% Average Intensity of Staining:   Strong Progesterone Receptor (PgR) Status: Negative (less than 1%):    Internal control cells presentand stain as expected HER2 by Immunohistochemistry:  Negative (Score 1+) (T1cN1a)  vaginal pruritus, vaginal discharge, edema, change in bowel, change in urination, pelvic pain/cramping, breast pain, fatigue or weight loss.   Care team: Gyn Onc Lyndon Moyer Med Onc Natasha Ramirez: Anastrozole  PCP Kevin Humphreys Breast surgeon Caroline Abdul OR? med onc Rufus Lee

## 2025-02-05 NOTE — VITALS
[Maximal Pain Intensity: 7/10] : 7/10 [Least Pain Intensity: 0/10] : 0/10 [Pain Description/Quality: ___] : Pain description/quality: [unfilled] [Pain Duration: ___] : Pain duration: [unfilled] [Pain Location: ___] : Pain Location: [unfilled] [Pain Interferes with ADLs] : Pain interferes with activities of daily living. [NoTreatment Scheduled] : no treatment scheduled [80: Normal activity with effort; some signs or symptoms of disease.] : 80: Normal activity with effort; some signs or symptoms of disease.

## 2025-02-05 NOTE — DISEASE MANAGEMENT
[Pathological] : TNM Stage: p [FreeTextEntry4] : Left breast cancer. [TTNM] : 1c [NTNM] : 1 [MTNM] : 0

## 2025-02-05 NOTE — REVIEW OF SYSTEMS
[Negative] : Allergic/Immunologic [Edema Limbs: Grade 0] : Edema Limbs: Grade 0  [Fatigue: Grade 0] : Fatigue: Grade 0 [Localized Edema: Grade 0] : Localized Edema: Grade 0  [Neck Edema: Grade 0] : Neck Edema: Grade 0 [Breast Pain: Grade 1 - Mild pain] : Breast Pain: Grade 1 - Mild pain

## 2025-02-05 NOTE — HISTORY OF PRESENT ILLNESS
[FreeTextEntry1] : 72 year old woman  with cervical cancer and breast cancer. She underwent definitive chemoradiation for cervical cancer, with EBRT plus intracavitary brachytherapy, completed 12/6/2023. Started neoadjuvant endocrine therapy for breast cancer in 10/2023.  Interval history: She underwent left lumpectomy/sentinel lymph node biopsy on 1/10/2025.  Pathology showed invasive lobular carcinoma, measuring 1.8 cm.  Margins negative; based on immunostain, final margin negative with invasive carcinoma 2 mm from the inked margin.  Index lymph node replaced with metastatic carcinoma, with extensive fibrosis compatible with partial treatment effect.  An additional 3 sentinel lymph nodes were excised, and negative.  ypT1c ypN1a.  ER 71-80%, HI negative, HER2 negative.  Recovering well postoperatively, with decreasing left breast and axillary discomfort.  No upper extremity edema.  Care team: Gyn Onc Lyndon Moyer Med Onc Natasha Ramirez: PCP Kevin Humphreys Breast surgeon Caroline Abdul

## 2025-02-05 NOTE — HISTORY OF PRESENT ILLNESS
[FreeTextEntry1] : 72 year old woman  with cervical cancer and breast cancer. She underwent definitive chemoradiation for cervical cancer, with EBRT plus intracavitary brachytherapy, completed 12/6/2023. Started neoadjuvant endocrine therapy for breast cancer in 10/2023.  Interval history: She underwent left lumpectomy/sentinel lymph node biopsy on 1/10/2025.  Pathology showed invasive lobular carcinoma, measuring 1.8 cm.  Margins negative; based on immunostain, final margin negative with invasive carcinoma 2 mm from the inked margin.  Index lymph node replaced with metastatic carcinoma, with extensive fibrosis compatible with partial treatment effect.  An additional 3 sentinel lymph nodes were excised, and negative.  ypT1c ypN1a.  ER 71-80%, MI negative, HER2 negative.  Recovering well postoperatively, with decreasing left breast and axillary discomfort.  No upper extremity edema.  Care team: Gyn Onc Lyndon Moyer Med Onc Natasha Ramirez: PCP Kevin Humphreys Breast surgeon Caroline Abdul

## 2025-02-21 NOTE — REASON FOR VISIT
[Routine On-Treatment] : a routine on-treatment visit for [Breast Cancer] : breast cancer [Other: ___] : [unfilled]

## 2025-02-25 NOTE — CONSULT LETTER
[Dear  ___] : Dear  [unfilled], [Consult Letter:] : I had the pleasure of evaluating your patient, [unfilled]. [Please see my note below.] : Please see my note below. [Consult Closing:] : Thank you very much for allowing me to participate in the care of this patient.  If you have any questions, please do not hesitate to contact me. [Sincerely,] : Sincerely, [FreeTextEntry3] : Natasha Ramirez MD Medical Oncology/Hematology [facility] [place], [facility]   [employer] [employer] [facility] [employer] [facility]

## 2025-02-25 NOTE — HISTORY OF PRESENT ILLNESS
[de-identified] : Ms. Vega is a 72 year old female PMH cervical cancer, currently undergoing concurrent chemotherapy with weekly Cisplatin and RT, who is diagnosed with a synchronous left breast invasive lobular carcinoma ER 95% NJ 95% HER2 0 Negative and DCIS with metastatic left axillary invasive carcinoma consistent with lobular carcinoma. cT1N1.   Patient obtained staging PET/CT on 9/20/23 for cervical cancer  - demonstrating Diffusely increased activity in the prominent cervix compatible with biopsied malignancy. No hypermetabolic pelvic sidewall or retroperitoneal nodes. Prominent elongated left axillary node measures 3.8 x 1.1 cm with SUV 3.   09/29/2023 Diagnostic bilateral mammo and left breast sono- There are scattered areas of fibroglandular density. No suspicious mass, suspicious microcalcifications, or other sign of malignancy is identified in the right breast.Indeterminate asymmetry is present in the lateral anterior left breast.A prominent left axillary lymph node is noted. US-The breast parenchyma demonstrates a heterogeneous background echotexture (mixed fatty and fibroglandular). Impression- Suspicious left breast finding at 3:00, 1 cm from the nipple, as well as the left axilla, ultrasound-guided biopsy of both is recommended.  10/9/23 Underwent left breast and left axillary US guided core biopsy  1. Left breast 3:00 1cmfn US guided core biopsy- Invasive lobular carcinoma, ER 95% NJ 05% HER2 0 Negative, classic type, measure 14mm, lobular carcinoma in situ and DCIS, microcalficiatons are present in LCIS, lymphovascular permeation by  tumor is not seen 2. Left axillary lymph node US guided core biopsy- Invasive carcinoma consistent with lobular carcinoma metastatic lymph node, measures 14mm, extracapsular extension is not present     Patient presents for initial visit.  Notes receiving 2 cycle's of Cisplatin, notes Cisplatin this week was cancelled due to elevated Creatinine, s/p [facility] admission for 2 days for hydration, pending follow up for further planning with Dr Lee  Undergoing RT for cervical cancer Has left breast pain with bruising and left nipple retraction since breast biopsy.  Notes never obtaining mammogram prior to 9/2023  [de-identified] : Pt presents for follow up with  S/p left lumpectomy and SNLB on 1/10/25 Started radiation on 2/24/25

## 2025-02-25 NOTE — ASSESSMENT
[FreeTextEntry1] : 72 year old female with synchronous cervical and stage IIA breast cancer. She was undergoing treatment for stage IB squamous cell cervical cancer, CRT with weekly Cisplatin, diagnosed synchronous left breast invasive lobular carcinoma ER 95% VT 95% HER2 0 Negative metastatic to left axillary LN. cT1N1.  As she was undergoing treatment for cervical cancer, she started neoadjuvant endocrine therapy for breast cancer. Started Anastrozole 1 mg daily on 11/17/23. She completed RT for cervical cancer on 12/6/23. Oncotype Dx RS 2, no benefit from chemotherapy, and DRR 9% at 9 years with Lama or AI.  Case discussed at tumor board 2/28/24. 3/2024 US breast and PET with interval response in left axillary lymph node with similar appearance of left breast mass. Continue endocrine therapy.   9/23/24 DEXA: Osteopenia  S/p left lumpectomy and SNLB on 1/10/25 -  Pathology: Invasive lobular carcinoma with changes compatible with partial treatment effect, grade 2, 1.8 cm, Margins negative. 1/4 Lymph nodes with macromet, +ROSA.  IHC on axillary node: ER 71-80%, VT <1%, HER2 1+/negative.    Plan:  Started adjuvant RT on 2/24/25 Continue anastrozole.  Discussed adjuvant ribociclib given node positive disease, she would qualify based on GENE criteria. Impact on OS is unknown at this time but improves invasive disease free survival. S/e of Ribo discussed. She will think about but is less inclined.  Next DEXA 9/2026 Continue vitamin D3 2000 IU daily RTO 3 months.

## 2025-02-26 NOTE — PHYSICAL EXAM
[Normal] : oriented to person, place and time, the affect was normal, the mood was normal and not anxious [de-identified] : no erythema; post-operative seroma left breast and axilla

## 2025-02-26 NOTE — DISEASE MANAGEMENT
[Pathological] : TNM Stage: p [IB] : IB [FreeTextEntry4] : Left breast cancer. [TTNM] : 1c [NTNM] : 1 [MTNM] : 0 [de-identified] : [id number] [[place] [address]] : [id number] [de-identified] : left breast

## 2025-02-26 NOTE — PHYSICAL EXAM
[Normal] : oriented to person, place and time, the affect was normal, the mood was normal and not anxious [de-identified] : no erythema; post-operative seroma left breast and axilla

## 2025-02-26 NOTE — DISEASE MANAGEMENT
[Pathological] : TNM Stage: p [IB] : IB [FreeTextEntry4] : Left breast cancer. [TTNM] : 1c [NTNM] : 1 [MTNM] : 0 [de-identified] : 404 [[place] [address]] : [id number] [de-identified] : left breast

## 2025-03-07 NOTE — PHYSICAL EXAM
[Normal] : normal heart rate and rhythm, normal S1 and S2, and no murmurs present [de-identified] : no erythema; post-operative seroma left breast and axilla

## 2025-03-07 NOTE — DISEASE MANAGEMENT
[Pathological] : TNM Stage: p [IB] : IB [FreeTextEntry4] : Left breast cancer. [TTNM] : 1c [NTNM] : 1 [MTNM] : 0 [de-identified] : [id number] [[place] [address]] : [id number] [de-identified] : left breast

## 2025-03-07 NOTE — VITALS
[Maximal Pain Intensity: 0/10] : 0/10 [Least Pain Intensity: 0/10] : 0/10 [90: Able to carry normal activity; minor signs or symptoms of disease.] : 90: Able to carry normal activity; minor signs or symptoms of disease.  [Maximal Pain Intensity: 4/10] : 4/10 [Pain Description/Quality: ___] : Pain description/quality: [unfilled] [Pain Duration: ___] : Pain duration: [unfilled] [Pain Location: ___] : Pain Location: [unfilled] [NoTreatment Scheduled] : no treatment scheduled [Pain Interferes with ADLs] : Pain does not interfere with activities of daily living

## 2025-03-07 NOTE — REVIEW OF SYSTEMS
[Skin Hyperpigmentation: Grade 0] : Skin Hyperpigmentation: Grade 0 [Dermatitis Radiation: Grade 0] : Dermatitis Radiation: Grade 0 [Edema Limbs: Grade 0] : Edema Limbs: Grade 0  [Localized Edema: Grade 1 - Localized to dependent areas, no disability or functional impairment] : Localized Edema: Grade 1 - Localized to dependent areas, no disability or functional impairment [Neck Edema: Grade 0] : Neck Edema: Grade 0 [Breast Pain: Grade 1 - Mild pain] : Breast Pain: Grade 1 - Mild pain [Fatigue: Grade 1 - Fatigue relieved by rest] : Fatigue: Grade 1 - Fatigue relieved by rest

## 2025-03-07 NOTE — PHYSICAL EXAM
[Normal] : normal heart rate and rhythm, normal S1 and S2, and no murmurs present [de-identified] : no erythema; post-operative seroma left breast and axilla

## 2025-03-07 NOTE — DISEASE MANAGEMENT
[Pathological] : TNM Stage: p [IB] : IB [FreeTextEntry4] : Left breast cancer. [TTNM] : 1c [NTNM] : 1 [MTNM] : 0 [de-identified] : [id number] [[place] [address]] : [id number] [de-identified] : left breast

## 2025-03-12 NOTE — REVIEW OF SYSTEMS
[Edema Limbs: Grade 0] : Edema Limbs: Grade 0  [Fatigue: Grade 1 - Fatigue relieved by rest] : Fatigue: Grade 1 - Fatigue relieved by rest [Localized Edema: Grade 1 - Localized to dependent areas, no disability or functional impairment] : Localized Edema: Grade 1 - Localized to dependent areas, no disability or functional impairment [Neck Edema: Grade 0] : Neck Edema: Grade 0 [Breast Pain: Grade 1 - Mild pain] : Breast Pain: Grade 1 - Mild pain [Skin Hyperpigmentation: Grade 0] : Skin Hyperpigmentation: Grade 0 [Dermatitis Radiation: Grade 0] : Dermatitis Radiation: Grade 0

## 2025-03-13 NOTE — DISEASE MANAGEMENT
[Pathological] : TNM Stage: p [IB] : IB [FreeTextEntry4] : Left breast cancer. [TTNM] : 1c [NTNM] : 1 [MTNM] : 0 [de-identified] : [id number] [de-identified] : [id number] [de-identified] : left breast

## 2025-03-13 NOTE — PHYSICAL EXAM
[Normal] : oriented to person, place and time, the affect was normal, the mood was normal and not anxious [de-identified] : no erythema; post-operative seroma left breast and axilla unchanged

## 2025-03-13 NOTE — PHYSICAL EXAM
[Normal] : oriented to person, place and time, the affect was normal, the mood was normal and not anxious [de-identified] : no erythema; post-operative seroma left breast and axilla unchanged

## 2025-03-13 NOTE — VITALS
[Maximal Pain Intensity: 4/10] : 4/10 [Least Pain Intensity: 0/10] : 0/10 done [Pain Description/Quality: ___] : Pain description/quality: [unfilled] [Pain Duration: ___] : Pain duration: [unfilled] [Pain Location: ___] : Pain Location: [unfilled] [NoTreatment Scheduled] : no treatment scheduled [90: Able to carry normal activity; minor signs or symptoms of disease.] : 90: Able to carry normal activity; minor signs or symptoms of disease.  [Pain Interferes with ADLs] : Pain does not interfere with activities of daily living

## 2025-03-13 NOTE — DISEASE MANAGEMENT
[Pathological] : TNM Stage: p [FreeTextEntry4] : Left breast cancer. [TTNM] : 1c [NTNM] : 1 [MTNM] : 0 [IB] : IB [de-identified] : [id number] [de-identified] : 7097 [de-identified] : left breast

## 2025-03-13 NOTE — VITALS
[Maximal Pain Intensity: 4/10] : 4/10 [Least Pain Intensity: 0/10] : 0/10 [Pain Description/Quality: ___] : Pain description/quality: [unfilled] [Pain Duration: ___] : Pain duration: [unfilled] [Pain Location: ___] : Pain Location: [unfilled] [Pain Interferes with ADLs] : Pain does not interfere with activities of daily living [NoTreatment Scheduled] : no treatment scheduled [90: Able to carry normal activity; minor signs or symptoms of disease.] : 90: Able to carry normal activity; minor signs or symptoms of disease.

## 2025-03-13 NOTE — PHYSICAL EXAM
[Normal] : oriented to person, place and time, the affect was normal, the mood was normal and not anxious [de-identified] : no erythema; post-operative seroma left breast and axilla unchanged

## 2025-03-13 NOTE — DISEASE MANAGEMENT
[Pathological] : TNM Stage: p [IB] : IB [FreeTextEntry4] : Left breast cancer. [TTNM] : 1c [NTNM] : 1 [MTNM] : 0 [de-identified] : [id number] [de-identified] : 1339 [de-identified] : left breast

## 2025-03-19 NOTE — DISEASE MANAGEMENT
[FreeTextEntry4] : Left breast cancer. [TTNM] : 1c [NTNM] : 1 [MTNM] : 0 [de-identified] : [id number] [de-identified] : [id number] [de-identified] : left breast

## 2025-03-19 NOTE — DISEASE MANAGEMENT
[FreeTextEntry4] : Left breast cancer. [TTNM] : 1c [NTNM] : 1 [MTNM] : 0 [de-identified] : 1381 [de-identified] : [id number] [de-identified] : left breast

## 2025-03-24 NOTE — DISEASE MANAGEMENT
[Pathological] : TNM Stage: p [FreeTextEntry4] : Left breast cancer. [TTNM] : 1c [NTNM] : 1 [MTNM] : 0 [IB] : IB

## 2025-03-24 NOTE — HISTORY OF PRESENT ILLNESS
[FreeTextEntry1] : 72-year-old woman with history of cervical cancer (cT1b3 N0 M0, squamous cell), status post definitive radiation completed 12/6/23. Also with node-positive breast cancer, status post neoadjuvant endocrine therapy, now status post left lumpectomy/sentinel lymph node biopsy on 1/10/2025 (ypT1c ypN1a, 1/4LN, +ROSA) now completing adjuvant radiation to the left breast and nodes 3/21/25. Interval history: vaginal pruritus, vaginal discharge, edema, change in bowel, change in urination, pelvic pain/cramping, breast pain, fatigue or weight loss.  Care team: Gyn Onc Lyndon Moyer Med Onc Natasha Ramirez: Anastrozole  PCP Kevin Humphreys Breast surgeon Caroline Abdul OR? med onc Rufus Lee

## 2025-03-24 NOTE — REASON FOR VISIT
[Post-Treatment Evaluation] : post-treatment evaluation for [Breast Cancer] : breast cancer [Other: ___] : [unfilled]

## 2025-04-11 NOTE — PHYSICAL EXAM
[FreeTextEntry1] : Gen: Patient is A&O x 3, NAD HEENT: EOMI, hearing grossly normal Resp: regular, non - labored CV: pulses regular Skin: no rashes, erythema Lymph: no clubbing, cyanosis, edema Inspection: no instability  ROM: full throughout Palpation: TTP left breast  Sensation: Hypersensitivity to LT in left breast   Reflexes: 1+ and symmetric throughout Strength: 5/5 throughout Special tests: -Esquivel sign Gait: normal, non-antalgic  Bioimpedance spectroscopy performed today with L-Dex 1.1 LUE (pre-op baseline -0.4)

## 2025-04-11 NOTE — ASSESSMENT
[FreeTextEntry1] : 72 year old female presenting for evaluation.  #Post-mastectomy pain syndrome: -Neuropathic pain -Start gabapentin 100mg BID-rx sent, she reports recent kidney function with PCP improved, advised to bring in labs for review  #At risk for lymphedema: -No clinical signs of lymphedema on exam -Lymphedema education provided to patient -Denies any electronic implantable devices  -Bioimpedance spectroscopy performed today with L-dex appropriate -Next surveillance in July 2025   Follow up in 1 month.  The patient had a follow-up SOZO measurement which I reviewed today.  Bioimpedance spectroscopy helps identify the onset of lymphedema in an arm or leg before patients experience noticeable swelling. Research has shown that the early detection of lymphedema using L-Dex combined with treatment can reduce progression to chronic lymphedema by 95% in breast cancer patients. Whenever possible, patients are tested for baseline L-Dex score before cancer treatment begins and then are reassessed during regular follow-up visits using the SOZO device. Otherwise, this can be started postoperatively and continued during regular follow-up visits. If the patients L-Dex score increases above normal levels, that is a sign that lymphedema is developing, and a referral is made to physical therapy for further evaluation and/or early compression treatment. Lymphedema assessment with the SOZO L-Dex score is recommended to be done every 3 months for the first 3 years and then every 6 months for years 4 and 5, followed by annually afterwards.

## 2025-04-11 NOTE — HISTORY OF PRESENT ILLNESS
[FreeTextEntry1] : 72-year-old  female she was undergoing treatment for stage IB squamous cell cervical cancer, CRT with weekly Cisplatin, diagnosed synchronous left breast invasive lobular carcinoma ER 95% SD 95% HER2 0 Negative metastatic to left axillary LN. cT1N1. As she was undergoing treatment for cervical cancer, she started neoadjuvant endocrine therapy for breast cancer.  Started Anastrozole 1 mg daily on 11/17/23.  She completed RT for cervical cancer on 12/6/23.  S/p left lumpectomy and SNLB on 1/10/25 -Pathology: Invasive lobular carcinoma with changes compatible with partial treatment effect, grade 2, 1.8 cm, Margins negative. 1/4 Lymph nodes with macromet, +ROSA. IHC on axillary node: ER 71-80%, SD <1%, HER2 1+/negative.  Started adjuvant RT on 2/24/25, completed.  Continue anastrozole.   She reports that she is feeling burning pain in her left breast region.  Reports that she feels sharp and shooting pains that come and go.  Has been present since surgery a little worse since radiation treatment.  Has good shoulder range of motion.  Denies any swelling or heaviness in her left upper extremity.

## 2025-04-23 NOTE — END OF VISIT
[FreeTextEntry3] : PET CT follow up RTC in 6 months for surveillance (3 months with Dr. Garcia/Ariana) [FreeTextEntry2] : Katherine Kline MA was present the entire duration of the patient interaction and gynecological exam.

## 2025-04-23 NOTE — PHYSICAL EXAM
[Chaperoned Physical Exam] : A chaperone was present in the examining room during all aspects of the physical examination. [MA] : MA [FreeTextEntry2] : Katherine Kline MA was present the entire duration of the patient interaction and gynecological exam. [Abnormal] : Adnexa(ae): Abnormal [Normal] : Recto-Vaginal Exam: Normal [de-identified] : soft, non-tender [de-identified] : shortened and aggluinated, radiation changes, no discharge, no visible cervix [de-identified] : agglutinated to upper vagina [de-identified] : no masses, or lesions [de-identified] : normal rectal tone, no palpable masses on cervix or parametria/pelvis [de-identified] : no masses on cervix [Restricted in physically strenuous activity but ambulatory and able to carry out work of a light or sedentary nature] : Status 1- Restricted in physically strenuous activity but ambulatory and able to carry out work of a light or sedentary nature, e.g., light house work, office work

## 2025-04-23 NOTE — PHYSICAL EXAM
[Chaperoned Physical Exam] : A chaperone was present in the examining room during all aspects of the physical examination. [MA] : MA [FreeTextEntry2] : Katherine Kline MA was present the entire duration of the patient interaction and gynecological exam. [Abnormal] : Adnexa(ae): Abnormal [Normal] : Recto-Vaginal Exam: Normal [de-identified] : soft, non-tender [de-identified] : shortened and aggluinated, radiation changes, no discharge, no visible cervix [de-identified] : agglutinated to upper vagina [de-identified] : no masses, or lesions [de-identified] : normal rectal tone, no palpable masses on cervix or parametria/pelvis [de-identified] : no masses on cervix [Restricted in physically strenuous activity but ambulatory and able to carry out work of a light or sedentary nature] : Status 1- Restricted in physically strenuous activity but ambulatory and able to carry out work of a light or sedentary nature, e.g., light house work, office work

## 2025-04-23 NOTE — ASSESSMENT
[FreeTextEntry1] : Pt is a 73 yo with Stage Ib3 squamous cell carcinoma s/p chemo-radiation treatment. Patient is s/p PEUA Cervical biopsy and ECC on 8/28/23. She was diagnoed with left breast IDC and now s/p partial mastectomy, SLND on 1/21/25 by Dr. Miranda and whole breast radiation completed 3/2025.   No evidence of cervical cancer recurrence. Recommend 6 month follow up PET CT. Patient asymtpomatic and normal pelvic exam and rectal exam. Exam limited by agglutination of upper vagina and shortened vaginal lenght, but rectal exam without concerning findings.

## 2025-04-23 NOTE — HISTORY OF PRESENT ILLNESS
[FreeTextEntry1] : Pt is a 71 yo with Stage Ib3 squamous cell carcinoma s/p chemo-radiation treatment. Patient is s/p PEUA Cervical biopsy and ECC on 8/28/23. Pt had completed treatment and had residual FDG activity in cervix 4/2024 of 4.2. Follow up MRI without focal lesions at that time and had stable activity in the area on 3 month follow up as recommended by TB. She had last PET CT done 10/11/2024.   She was diagnosed with left breast IDC s/p partial mastectomy and sentinel lymph node biopsy by Dr. Miranda on 1/21/25 and whole breast radiation. She is recovering well. No new gynecologic issues, changes in bowel/bladder function. Not sexually active.  10/11/24 PET/CT : IMPRESSION: Since prior FDG-PET/CT scan 6/17/2024: 1.  Persistent focus at the region of the LEFT cervix remains grossly stable. Consider pelvic MRI for further evaluation if this will change patient management. Other findings in this area are currently unremarkable. 2.  Stable posttreatment changes at LEFT breast. 3.  Otherwise, no new suspicious findings elsewhere. Attention to follow-up.

## 2025-04-23 NOTE — HISTORY OF PRESENT ILLNESS
[FreeTextEntry1] : Pt is a 73 yo with Stage Ib3 squamous cell carcinoma s/p chemo-radiation treatment. Patient is s/p PEUA Cervical biopsy and ECC on 8/28/23. Pt had completed treatment and had residual FDG activity in cervix 4/2024 of 4.2. Follow up MRI without focal lesions at that time and had stable activity in the area on 3 month follow up as recommended by TB. She had last PET CT done 10/11/2024.   She was diagnosed with left breast IDC s/p partial mastectomy and sentinel lymph node biopsy by Dr. Miranda on 1/21/25 and whole breast radiation. She is recovering well. No new gynecologic issues, changes in bowel/bladder function. Not sexually active.  10/11/24 PET/CT : IMPRESSION: Since prior FDG-PET/CT scan 6/17/2024: 1.  Persistent focus at the region of the LEFT cervix remains grossly stable. Consider pelvic MRI for further evaluation if this will change patient management. Other findings in this area are currently unremarkable. 2.  Stable posttreatment changes at LEFT breast. 3.  Otherwise, no new suspicious findings elsewhere. Attention to follow-up.

## 2025-04-23 NOTE — ASSESSMENT
[FreeTextEntry1] : Pt is a 71 yo with Stage Ib3 squamous cell carcinoma s/p chemo-radiation treatment. Patient is s/p PEUA Cervical biopsy and ECC on 8/28/23. She was diagnoed with left breast IDC and now s/p partial mastectomy, SLND on 1/21/25 by Dr. Miranda and whole breast radiation completed 3/2025.   No evidence of cervical cancer recurrence. Recommend 6 month follow up PET CT. Patient asymtpomatic and normal pelvic exam and rectal exam. Exam limited by agglutination of upper vagina and shortened vaginal lenght, but rectal exam without concerning findings.

## 2025-04-23 NOTE — REASON FOR VISIT
[FreeTextEntry1] : [facility] [place]. [phone] [address] [place] [address]

## 2025-04-28 NOTE — VITALS
[Maximal Pain Intensity: 3/10] : 3/10 [Least Pain Intensity: 0/10] : 0/10 [80: Normal activity with effort; some signs or symptoms of disease.] : 80: Normal activity with effort; some signs or symptoms of disease.

## 2025-04-28 NOTE — HISTORY OF PRESENT ILLNESS
[FreeTextEntry1] : 72-year-old woman with history of cervical cancer (cT1b3 N0 M0, squamous cell), status post definitive radiation completed 12/6/23. Also with node-positive breast cancer, status post neoadjuvant endocrine therapy, now status post left lumpectomy/sentinel lymph node biopsy on 1/10/2025 (ypT1c ypN1a, 1/4LN, +ROSA) now completing adjuvant radiation to the left breast and nodes 3/21/25. Seen today via telephone follow up at her request.  Interval history: notes intermittent sharp shooting pain left breast No vaginal pruritus, vaginal discharge, edema, change in bowel, change in urination, pelvic pain/cramping, fatigue or weight loss.  Care team: Gyn Onc [facility] Med Onc Natasha Ramirez: Anastrozole  PCP Kevin Humphreys Breast surgeon Caroline Abdul OR? med onc Rufus Lee

## 2025-04-29 NOTE — HISTORY OF PRESENT ILLNESS
[FreeTextEntry1] : 72-year-old woman with history of cervical cancer (cT1b3 N0 M0, squamous cell), status post definitive radiation completed 12/6/23. Also with node-positive breast cancer, status post neoadjuvant endocrine therapy, now status post left lumpectomy/sentinel lymph node biopsy on 1/10/2025 (ypT1c ypN1a, 1/4LN, +ROSA) now completing adjuvant radiation to the left breast and nodes 3/21/25. Seen today via telephone follow up at her request.  Interval history: 5/12/25 Pet CT  intermittent sharp shooting pain left breast No vaginal pruritus, vaginal discharge, edema, change in bowel, change in urination, pelvic pain/cramping, fatigue or weight loss.  Care team: Gyn Onc [facility] Med Onc Natasha Ramirez: Anastrozole  PCP Kevin Humphreys Breast surgeon Caroline Abdul OR? med onc Rufus Lee

## 2025-05-09 NOTE — HISTORY OF PRESENT ILLNESS
[FreeTextEntry1] : 72-year-old  female she was undergoing treatment for stage IB squamous cell cervical cancer, CRT with weekly Cisplatin, diagnosed synchronous left breast invasive lobular carcinoma ER 95% PA 95% HER2 0 Negative metastatic to left axillary LN. cT1N1. As she was undergoing treatment for cervical cancer, she started neoadjuvant endocrine therapy for breast cancer.  Started Anastrozole 1 mg daily on 11/17/23.  She completed RT for cervical cancer on 12/6/23.  S/p left lumpectomy and SNLB on 1/10/25 -Pathology: Invasive lobular carcinoma with changes compatible with partial treatment effect, grade 2, 1.8 cm, Margins negative. 1/4 Lymph nodes with macromet, +ROSA. IHC on axillary node: ER 71-80%, PA <1%, HER2 1+/negative.  Started adjuvant RT on 2/24/25, completed.  Continue anastrozole.  She denies any swelling or heaviness in her left upper extremity.  She reports that she started gabapentin denies any side effects to this.  Thinks has been helping with her breast and chest wall pain however she still feels burning and pins-and-needles in this region.  No new focal weakness.

## 2025-05-09 NOTE — ASSESSMENT
[FreeTextEntry1] : 72 year old female presenting for evaluation.  #Post-mastectomy pain syndrome: -Discussed PT, she would like to defer -Monitor   #Neuropathic pain -Increase gabapentin to 300mg BID-rx sent  #At risk for lymphedema: -No clinical signs of lymphedema on exam -Lymphedema education provided to patient -Denies any electronic implantable devices  -Next surveillance in July 2025   Follow up in 1 month.

## 2025-05-23 NOTE — PHYSICAL EXAM
[Sclera] : the sclera and conjunctiva were normal [Extraocular Movements] : extraocular movements were intact [Outer Ear] : the ears and nose were normal in appearance [Heart Rate And Rhythm] : heart rate and rhythm were normal [No UE Edema] : there is no upper extremity edema [Supraclavicular Lymph Nodes Enlarged Bilaterally] : supraclavicular [Axillary Lymph Nodes Enlarged Bilaterally] : axillary [] : no rash [Normal] : oriented to person, place and time, the affect was normal, the mood was normal and not anxious [de-identified] : Posttreatment induration left central breast.  No palpable suspicious nodularity bilaterally.  Mild hyperpigmentation of the left IMF. [de-identified] : Patient deferred

## 2025-05-23 NOTE — REVIEW OF SYSTEMS
[Constipation: Grade 0] : Constipation: Grade 0 [Diarrhea: Grade 0] : Diarrhea: Grade 0 [Urinary Tract Pain: Grade 0] : Urinary Tract Pain: Grade 0 [Dyspareunia: Grade 0] : Dyspareunia: Grade 0

## 2025-05-23 NOTE — PHYSICAL EXAM
[Sclera] : the sclera and conjunctiva were normal [Extraocular Movements] : extraocular movements were intact [Outer Ear] : the ears and nose were normal in appearance [Heart Rate And Rhythm] : heart rate and rhythm were normal [No UE Edema] : there is no upper extremity edema [Supraclavicular Lymph Nodes Enlarged Bilaterally] : supraclavicular [Axillary Lymph Nodes Enlarged Bilaterally] : axillary [] : no rash [Normal] : oriented to person, place and time, the affect was normal, the mood was normal and not anxious [de-identified] : Posttreatment induration left central breast.  No palpable suspicious nodularity bilaterally.  Mild hyperpigmentation of the left IMF. [de-identified] : Patient deferred

## 2025-05-23 NOTE — HISTORY OF PRESENT ILLNESS
[FreeTextEntry1] : 72-year-old woman with history of cervical cancer (cT1b3 N0 M0, squamous cell), status post definitive radiation completed 12/6/23. Also with node-positive breast cancer, status post neoadjuvant endocrine therapy, now status post left lumpectomy/sentinel lymph node biopsy on 1/10/2025 (ypT1c ypN1a, 1/4LN, +ROSA) followed by adjuvant radiation to the left breast and nodes 3/21/25.  Continues on anastrozole.  Interval history: Followed up with GYN oncology in 4/2025.  Clinically FELIPE.  5/12/25 Pet CT showed posttreatment changes in the left breast, without FDG avid lymphadenopathy or distant FDG avid disease.  No abnormal FDG uptake in the cervix.  She continues to note intermittent shooting pains in the left breast.  She has started gabapentin, and follows with PM&R.  Reports tolerating AI well.  No significant myalgias or arthralgias. No breast or arm edema.  No vaginal discharge or pruritus.  No change in  or GI symptoms.   Of note, her son was recently admitted for an MI, and in the process of cardiac evaluation for stent or CABG.  She is understandably anxious regarding his health.  Care team: Gyn Onc [facility] Med Onc Natasha Ramirez PCP Kevin Humphreys Breast surgeon Caroline Abdul

## 2025-05-27 NOTE — PHYSICAL EXAM
[Normal] : oriented to person, place and time, the affect was normal, the mood was normal and not anxious [Extraocular Movements] : extraocular movements were intact [Outer Ear] : the ears and nose were normal in appearance [Normal] : normal external genitalia [de-identified] : Examination performed in the presence of a female chaperone.  Treatment related changes, with narrowing of the upper vagina.  No mass lesions appreciable.  No tenderness to palpation. Fall Risk

## 2025-06-06 NOTE — HISTORY OF PRESENT ILLNESS
[FreeTextEntry1] : 72-year-old  female she was undergoing treatment for stage IB squamous cell cervical cancer, CRT with weekly Cisplatin, diagnosed synchronous left breast invasive lobular carcinoma ER 95% SD 95% HER2 0 Negative metastatic to left axillary LN. cT1N1. As she was undergoing treatment for cervical cancer, she started neoadjuvant endocrine therapy for breast cancer.  Started Anastrozole 1 mg daily on 11/17/23.  She completed RT for cervical cancer on 12/6/23.  S/p left lumpectomy and SNLB on 1/10/25 -Pathology: Invasive lobular carcinoma with changes compatible with partial treatment effect, grade 2, 1.8 cm, Margins negative. 1/4 Lymph nodes with macromet, +ROSA. IHC on axillary node: ER 71-80%, SD <1%, HER2 1+/negative.  Started adjuvant RT on 2/24/25, completed.  Continue anastrozole.   She reports that increase in gabapentin has helped significantly with her breast pain.  No longer feels shooting or stabbing pain in her left breast.  Taking 300 mg twice a day.  She denies any side effects to this currently.  Reports it is helping like to resume on this current dose.  She denies any overt swelling or heaviness in her left upper extremity.  No erythema or increased warmth.

## 2025-06-06 NOTE — PHYSICAL EXAM
[FreeTextEntry1] : Gen: Patient is A&O x 3, NAD HEENT: EOMI, hearing grossly normal Resp: regular, non - labored CV: pulses regular Skin: no rashes, erythema Lymph: no clubbing, cyanosis, edema Inspection: no instability  ROM: full throughout Palpation: TTP left breast  Sensation: Hypersensitivity to LT in left breast   Reflexes: 1+ and symmetric throughout Strength: 5/5 throughout Special tests: -Esquivel sign Gait: normal, non-antalgic  Philomena present for encounter as chaperone  Bioimpedance spectroscopy performed today with L-Dex 6.7 LUE (pre-op baseline -0.4)

## 2025-06-06 NOTE — ASSESSMENT
[FreeTextEntry1] : 72 year old female presenting for evaluation.  #Post-mastectomy pain syndrome: -Discussed PT, she would like to defer -Monitor   #Neuropathic pain -Continue gabapentin 300mg BID-rx sent -Discussed with patient and  regarding dosing including decreasing, she would like to remain on current dose as has significantly improved pain without side effects -Monitor     #Lymphedema I89.0: -Lymphedema education provided to patient -Denies any electronic implantable devices  -Bioimpedance spectroscopy performed today with L-dex significant elevation  -Obtain US LUE  -Start compression sleeve and gauntlet 20-30mmHg daily-rx sent -The patient requires ready to wear compression sleeve and gauntlet due to her lymphedema to maintain and prevent worsening of this condition.  Lymphedema is a chronic/lifetime condition and the patient will have continued need for these supplies. -Discussed PT for MLD, she would like to defer for now, instructed to call if worsening of symptoms  -Next surveillance at next visit  Follow up in 1 month.  The patient had a follow-up SOZO measurement which I reviewed today.  Bioimpedance spectroscopy helps identify the onset of lymphedema in an arm or leg before patients experience noticeable swelling. Research has shown that the early detection of lymphedema using L-Dex combined with treatment can reduce progression to chronic lymphedema by 95% in breast cancer patients. Whenever possible, patients are tested for baseline L-Dex score before cancer treatment begins and then are reassessed during regular follow-up visits using the SOZO device. Otherwise, this can be started postoperatively and continued during regular follow-up visits. If the patients L-Dex score increases above normal levels, that is a sign that lymphedema is developing, and a referral is made to physical therapy for further evaluation and/or early compression treatment. Lymphedema assessment with the SOZO L-Dex score is recommended to be done every 3 months for the first 3 years and then every 6 months for years 4 and 5, followed by annually afterwards.

## 2025-07-18 NOTE — ASSESSMENT
[FreeTextEntry1] : 72 year old female presenting for evaluation.  #Post-mastectomy pain syndrome: -Discussed PT, she would like to defer -Continue HEP -Monitor   #Neuropathic pain -Continue gabapentin 300mg BID-rx sent -Monitor     #Lymphedema/At Risk:  -No clinical signs of lymphedema on exam -Lymphedema education provided to patient -Denies any electronic implantable devices  -Bioimpedance spectroscopy performed today with L-dex reduction, s/p compression -US reviewed, negative for DVT -Compression sleeve and gauntlet 20-30mmHg daily prn for symptoms  -Recheck in 6 weeks.    The patient had a follow-up SOZO measurement which I reviewed today.  Bioimpedance spectroscopy helps identify the onset of lymphedema in an arm or leg before patients experience noticeable swelling. Research has shown that the early detection of lymphedema using L-Dex combined with treatment can reduce progression to chronic lymphedema by 95% in breast cancer patients. Whenever possible, patients are tested for baseline L-Dex score before cancer treatment begins and then are reassessed during regular follow-up visits using the SOZO device. Otherwise, this can be started postoperatively and continued during regular follow-up visits. If the patients L-Dex score increases above normal levels, that is a sign that lymphedema is developing, and a referral is made to physical therapy for further evaluation and/or early compression treatment. Lymphedema assessment with the SOZO L-Dex score is recommended to be done every 3 months for the first 3 years and then every 6 months for years 4 and 5, followed by annually afterwards.

## 2025-07-18 NOTE — HISTORY OF PRESENT ILLNESS
[FreeTextEntry1] : 72-year-old  female she was undergoing treatment for stage IB squamous cell cervical cancer, CRT with weekly Cisplatin, diagnosed synchronous left breast invasive lobular carcinoma ER 95% RI 95% HER2 0 Negative metastatic to left axillary LN. cT1N1. As she was undergoing treatment for cervical cancer, she started neoadjuvant endocrine therapy for breast cancer.  Started Anastrozole 1 mg daily on 11/17/23.  She completed RT for cervical cancer on 12/6/23.  S/p left lumpectomy and SNLB on 1/10/25 -Pathology: Invasive lobular carcinoma with changes compatible with partial treatment effect, grade 2, 1.8 cm, Margins negative. 1/4 Lymph nodes with macromet, +ROSA. IHC on axillary node: ER 71-80%, RI <1%, HER2 1+/negative.  Started adjuvant RT on 2/24/25, completed.  Continue anastrozole.  She reports that gabapentin continues to help with her chest wall pain.  She denies any swelling or heaviness in her upper extremity.  No erythema or increased warmth.  Has been wearing compression sleeve regularly.  No new focal weakness.  Denies any side effects of current medications.

## 2025-07-18 NOTE — PHYSICAL EXAM
[FreeTextEntry1] : Gen: Patient is A&O x 3, NAD HEENT: EOMI, hearing grossly normal Resp: regular, non - labored CV: pulses regular Skin: no rashes, erythema Lymph: No edema in UE Inspection: no instability  ROM: full throughout Palpation: TTP left breast  Sensation: Hypersensitivity to LT in left breast   Reflexes: 1+ and symmetric throughout Strength: 5/5 throughout Special tests: -Esquivel sign Gait: normal, non-antalgic  Philomena present for encounter as chaperone  Bioimpedance spectroscopy performed today with L-Dex 5.1 LUE (pre-op baseline -0.4)

## 2025-07-22 NOTE — ASSESSMENT
[FreeTextEntry1] : Patient is a 72 year old female with  left breast 3:00 cT1cN1 lobular carcinoma, with DCIS and LCIS, ER/ND +, Her 2 negative. Underwent left avery PM, TAD, SLNB, final path revealing 18mm of residual disease, margins negative but close medial margin, 1mm; right axillary lymph node with known metastatic dz demonstrating partial treatment effect, 27mm and ROSA.   CBE is overall benign. Surgical changes noted. She is overdue for breast imaging and advised to have that done as soon as feasible. Followup in 6 months for CBE.   Patient verbalized understanding for all treatment plans discussed. All of her questions were answered to the best of my ability. She was encouraged to call the office if any questions or concerns or come in sooner if needed.   Plan: 1. MMG/US now 2. Followup in 6 months 3. Followup med onc, rad onc, PMR

## 2025-07-22 NOTE — PHYSICAL EXAM
[Normocephalic] : normocephalic [Atraumatic] : atraumatic [EOMI] : extra ocular movement intact [PERRL] : pupils equal, round and reactive to light [Sclera nonicteric] : sclera nonicteric [Supple] : supple [No Supraclavicular Adenopathy] : no supraclavicular adenopathy [No Cervical Adenopathy] : no cervical adenopathy [Examined in the supine and seated position] : examined in the supine and seated position [Asymmetrical] : asymmetrical [Bra Size: ___] : Bra Size: [unfilled] [Grade 2] : Ptosis Grade 2 [No dominant masses] : no dominant masses in right breast  [No dominant masses] : no dominant masses left breast [No Nipple Retraction] : no left nipple retraction [No Nipple Discharge] : no left nipple discharge [No Axillary Lymphadenopathy] : no left axillary lymphadenopathy [No Edema] : no edema [de-identified] : R>L [de-identified] : Mitzy-areolar incision is well-approximated. There is palpable scar tissue at the lumpectomy site with minor radiation changes.  [de-identified] : Axillary incision is well-approximated. There is a mild, palpable seroma without overlying skin compromise. No erythema or drainage.

## 2025-07-22 NOTE — ASSESSMENT
[FreeTextEntry1] : Patient is a 72 year old female with  left breast 3:00 cT1cN1 lobular carcinoma, with DCIS and LCIS, ER/[place] +, Her 2 negative. Underwent left avery PM, TAD, SLNB, final path revealing 18mm of residual disease, margins negative but close medial margin, 1mm; right axillary lymph node with known metastatic dz demonstrating partial treatment effect, 27mm and ROSA.   CBE is overall benign. Surgical changes noted. She is overdue for breast imaging and advised to have that done as soon as feasible. Followup in 6 months for CBE.   Patient verbalized understanding for all treatment plans discussed. All of her questions were answered to the best of my ability. She was encouraged to call the office if any questions or concerns or come in sooner if needed.   Plan: 1. MMG/US now 2. Followup in 6 months 3. Followup med onc, rad onc, PMR

## 2025-07-22 NOTE — PHYSICAL EXAM
[Normocephalic] : normocephalic [Atraumatic] : atraumatic [EOMI] : extra ocular movement intact [PERRL] : pupils equal, round and reactive to light [Sclera nonicteric] : sclera nonicteric [Supple] : supple [No Supraclavicular Adenopathy] : no supraclavicular adenopathy [No Cervical Adenopathy] : no cervical adenopathy [Examined in the supine and seated position] : examined in the supine and seated position [Asymmetrical] : asymmetrical [Bra Size: ___] : Bra Size: [unfilled] [Grade 2] : Ptosis Grade 2 [No dominant masses] : no dominant masses in right breast  [No dominant masses] : no dominant masses left breast [No Nipple Retraction] : no left nipple retraction [No Nipple Discharge] : no left nipple discharge [No Axillary Lymphadenopathy] : no left axillary lymphadenopathy [No Edema] : no edema [de-identified] : R>L [de-identified] : Mitzy-areolar incision is well-approximated. There is palpable scar tissue at the lumpectomy site with minor radiation changes.  [de-identified] : Axillary incision is well-approximated. There is a mild, palpable seroma without overlying skin compromise. No erythema or drainage.

## 2025-07-22 NOTE — HISTORY OF PRESENT ILLNESS
[FreeTextEntry1] : Oncology List:  1. Left breast 3:00 1cm FN cT1cN1 --> [id number] Invasive lobular carcinoma with DCIS and LCIS, ER/NH +, Her 2 negative      -US 1.5 x 1.2 x 0.8 cm --> 1.6 X 0.8 X 0.9 cm (on recent US), biopsy 1.4 cm       -prominent left lymph node 3.1 x 1.4 x 2.9 cm - metastatic carcinoma --> 3. 1 x 0. 8 x 2.0 cm (on recent US) CONCORDANT        -Anastrozole started        -Oncotype DX RS 2     -Case discussed at TB 24 --> Continue Endocrine therapy for possibility at downstaging, patient desires BCT      -Restaging US demonstrating stable left breast mass and slightly decreased axillary lymph node      -Left avery PM, left TAD, SLNB 1/10/25          FINAL PATH: ILC measures 18mm, margins negative, 1mm from medial margin, 1/5 LN + for metastatic carcinoma, 27mm with ROSA, partial treatment effect in the breast and lymph node       -Radiation completed 3/25     -Continues on Anastrozole, declined Ribociclib 2. Stage 1B Squamous cell cervical cancer     -completed radiation 23       -weekly cisplatin   Care Team:  Rudy - James Mendoza - Radha Gyn onc-[facility] Gyn med onc-Rosa BHATIA- Petr  Interval History:  (24): Patient presents for follow up. She remains on Endocrine therapy and tolerating well. She does not notice any changes to her breast, but did not appreciate the mass initially.   (10/02/24): Patient presents for follow up. She has been on Endocrine therapy for about 10 months. She does not notice any changes to her breast. She still desires BCT if possible.   (25): Patient presents for post op followup. Reports soreness, mostly in her axilla. Also noticed increased swelling in the axilla since .   (25): Patient presents for 6 month follow up. On Anastrozole tolerating well. Declined Ribociclib. Reports occasional soreness in the area but overall feels better. She has been following with ANNA MARIE KoenigZO measurement elevated previously, started with a compression sleeve with improvement. Patient states she has been asymptomatic.    HPI: Patient is a 71-year-old female presenting for initial consultation on 23 for newly diagnosed left breast cancer. Patient had a PET scan on 23 after recent diagnosis of cervical cancer, on PET scan mild activity seen in the left breast with a prominent elongated left axillary node. Patient called back for diagnostic mammogram bilateral and ultrasound limited left on 23 that demonstrated an irregular mass at 3:00 1 cm FN 1. 5 x 0.x 7 x 1. 4 cm and a prominent atypical lymph node measuring 3. 1 x 1.4 x 2.9 cm. Ultrasound guided biopsy of left breast on 10/09/23 demonstrated invasive lobular carcinoma, classic type, measuring at least 14 mm, with LCIS and DCIS identified, ER/NH +, Her 2 negative. Ultrasound guided biopsy of lymph node consistent with lobular carcinoma metastatic to lymph nodes. She is currently undergoing treatment for cervical cancer with chemo/radiation.   BREAST HISTORY: This was patient's first mammogram. No prior breast history, no breast complaints. No family history of breast cancer.   NKDA, no latex allergy On aspirin, no AC h/o cardiac stent 2004, HL, thyroid dz  [facility] Impression - Diffusely increased activity in the prominent cervix compatible with biopsied malignancy. No hypermetabolic pelvic sidewall or retroperitoneal nodes. Prominent elongated left axillary node with mild activity may reflect a reactive process. Slightly more prominent left breast parenchymal tissue laterally with mild activity is nonspecific but should be correlated with recent mammography.   [facility] Bilateral and Ultrasound Breast Limited Left: Density B, Impression - No suspicious mass, microcalcifications or other sign of malignancy is identified in the right breast. Indeterminate asymmetry is present in the lateral anterior left breast. US - At 3:00 1 cm FN a 1. 5 x 0. 7 x 1.4 cm irregular mass of mixed echogenicity is identified with unsharp margins. This correlates with the mammogram. Ultrasound guided biopsy is recommended. In the left axilla a prominent atypical appearing lymph node is identified measuring 3. 1 x 1. 4 x 2.9 cm, ultrasound guided biopsy of this finding is also recommended. BI-RADS 4B   [facility] Left Breast: Wing clip, Impression - Invasive lobular carcionma, classic type. Invasive tumor measures at least 14 mm. Lobular carcinoma in situ and ductal carcinoma in situ are identified. microcalcififcations are present in LCIS, ER/NH +, Her 2 negative, CONCORDANT   [facility] Lymph Node: Twirl clip, Impression - consistent with lobular carcinoma metastatic to lymph nodes, CONCORDANT   [facility] Limited: Impression - 3:00 1 cm FN 1. 6 x 0.8 x 0.9 cm irregular shaped hypoechoic mass not significantly changed from 10/9/2023 where the mass measured 1. 5 x 1. 2 x 0.8 cm. Left axillary lymph node 3. 1 x 0.8 x 2.0 cm at site of biopsy proven metastatic carcinoma decreased in size from prior study 10/09/23 where the lymph node measured 3. 1 x 1. 6 x 3.0 cm. BI-RADS 6   24: [facility]: PET Scan: Impression - Compared to PET/CT 23 there has been interval response to therapy in the primary cervical cancer. Residual FDG avidity is evident in two areas, which can be further evaluated on MR to evaluate for residual disease versus attention on short term interval follow up PET/CT as clinically indicated. No FDG avid lymphadenopathy. Similar mild FDG avidity in the soft tissue around the biopsy clip in the left breast. Interval decreased in size and FDG avidity of a left axillary lymph node suggesting interval response to therapy.   24: [facility]: PET scan: Impression - Revisualized foci at the region of the cervix is largely unchanged, stable posttreatment changes at the left breast.   24: [facility]: US Breast Limited Left: Impression - Known biopsy proven left breast malignancy and malignant left axillary lymph node, both without signfiicant sonographic change.   10/11/24: [facility]: PET Scan: Impression - Since prior FDG-PET/CT scan 2024: 1.  Persistent focus at the region of the LEFT cervix remains grossly stable. Consider pelvic MRI for further evaluation if this will change patient management. Other findings in this area are currently unremarkable. 2.  Stable posttreatment changes at LEFT breast. 3.  Otherwise, no new suspicious findings elsewhere. Attention to follow-up.  24: [facility]: MRI Breast: Impression - Previously visualized left breast enhancement consistent with previously diagnosed malignancy is no longer definitively seen as compared to 2023 exam. Previously identified left breast satellite lesion central posterior location no longer identified. Findings are consistent with almost complete resolution by imaging. No new areas of suspicious enhancement.BI-RADS 6   25: [facility]: PET scan: Impression - posttreatment changes in the left breast, without FDG avid lymphadenopathy nor distant FDG avid disease, no abnormal FDG uptake in the cervix.

## 2025-07-22 NOTE — HISTORY OF PRESENT ILLNESS
[FreeTextEntry1] : Oncology List:  1. Left breast 3:00 1cm FN cT1cN1 --> [id number] Invasive lobular carcinoma with DCIS and LCIS, ER/[place] +, Her 2 negative      -US 1.5 x 1.2 x 0.8 cm --> 1.6 X 0.8 X 0.9 cm (on recent US), biopsy 1.4 cm       -prominent left lymph node 3.1 x 1.4 x 2.9 cm - metastatic carcinoma --> 3. 1 x 0. 8 x 2.0 cm (on recent US) CONCORDANT        -Anastrozole started        -Oncotype DX RS 2     -Case discussed at TB 24 --> Continue Endocrine therapy for possibility at downstaging, patient desires BCT      -Restaging US demonstrating stable left breast mass and slightly decreased axillary lymph node      -Left avery PM, left TAD, SLNB 1/10/25          FINAL PATH: ILC measures 18mm, margins negative, 1mm from medial margin, 1/5 LN + for metastatic carcinoma, 27mm with ROSA, partial treatment effect in the breast and lymph node       -Radiation completed 3/25     -Continues on Anastrozole, declined Ribociclib 2. Stage 1B Squamous cell cervical cancer     -completed radiation 23       -weekly cisplatin   Care Team:  Rudy - James Mendoza - Radha Gyn onc-[facility] Gyn med onc-Rosa BHATIA- Petr  Interval History:  (24): Patient presents for follow up. She remains on Endocrine therapy and tolerating well. She does not notice any changes to her breast, but did not appreciate the mass initially.   (10/02/24): Patient presents for follow up. She has been on Endocrine therapy for about 10 months. She does not notice any changes to her breast. She still desires BCT if possible.   (25): Patient presents for post op followup. Reports soreness, mostly in her axilla. Also noticed increased swelling in the axilla since .   (25): Patient presents for 6 month follow up. On Anastrozole tolerating well. Declined Ribociclib. Reports occasional soreness in the area but overall feels better. She has been following with ANNA MARIE KoenigZO measurement elevated previously, started with a compression sleeve with improvement. Patient states she has been asymptomatic.    HPI: Patient is a 71-year-old female presenting for initial consultation on 23 for newly diagnosed left breast cancer. Patient had a PET scan on 23 after recent diagnosis of cervical cancer, on PET scan mild activity seen in the left breast with a prominent elongated left axillary node. Patient called back for diagnostic mammogram bilateral and ultrasound limited left on 23 that demonstrated an irregular mass at 3:00 1 cm FN 1. 5 x 0.x 7 x 1. 4 cm and a prominent atypical lymph node measuring 3. 1 x 1.4 x 2.9 cm. Ultrasound guided biopsy of left breast on 10/09/23 demonstrated invasive lobular carcinoma, classic type, measuring at least 14 mm, with LCIS and DCIS identified, ER/[place] +, Her 2 negative. Ultrasound guided biopsy of lymph node consistent with lobular carcinoma metastatic to lymph nodes. She is currently undergoing treatment for cervical cancer with chemo/radiation.   BREAST HISTORY: This was patient's first mammogram. No prior breast history, no breast complaints. No family history of breast cancer.   NKDA, no latex allergy On aspirin, no AC h/o cardiac stent 2004, HL, thyroid dz  [facility] Impression - Diffusely increased activity in the prominent cervix compatible with biopsied malignancy. No hypermetabolic pelvic sidewall or retroperitoneal nodes. Prominent elongated left axillary node with mild activity may reflect a reactive process. Slightly more prominent left breast parenchymal tissue laterally with mild activity is nonspecific but should be correlated with recent mammography.   [facility]amm[facility] Bilateral and Ultrasound Breast Limited Left: Density B, Impression - No suspicious mass, microcalcifications or other sign of malignancy is identified in the right breast. Indeterminate asymmetry is present in the lateral anterior left breast. US - At 3:00 1 cm FN a 1. 5 x 0. 7 x 1.4 cm irregular mass of mixed echogenicity is identified with unsharp margins. This correlates with the mammogram. Ultrasound guided biopsy is recommended. In the left axilla a prominent atypical appearing lymph node is identified measuring 3. 1 x 1. 4 x 2.9 cm, ultrasound guided biopsy of this finding is also recommended. BI-RADS 4B   [facility] Bi[facility] Left Breast: Wing clip, Impression - Invasive lobular carcionma, classic type. Invasive tumor measures at least 14 mm. Lobular carcinoma in situ and ductal carcinoma in situ are identified. microcalcififcations are present in LCIS, ER/[place] +, Her 2 negative, CONCORDANT   [facility] Left Axilla Lymph Node: Twirl clip, Impression - consistent with lobular carcinoma metastatic to lymph nodes, CONCORDANT   [facility] Left Breast Ultrasound Limited: Impression - 3:00 1 cm FN 1. 6 x 0.8 x 0.9 cm irregular shaped hypoechoic mass not significantly changed from 10/9/2023 where the mass measured 1. 5 x 1. 2 x 0.8 cm. Left axillary lymph node 3. 1 x 0.8 x 2.0 cm at site of biopsy proven metastatic carcinoma decreased in size from prior study 10/09/23 where the lymph node measured 3. 1 x 1. 6 x 3.0 cm. BI-RADS 6   24: [facility]: PET Scan: Impression - Compared to PET/CT 23 there has been interval response to therapy in the primary cervical cancer. Residual FDG avidity is evident in two areas, which can be further evaluated on MR to evaluate for residual disease versus attention on short term interval follow up PET/CT as clinically indicated. No FDG avid lymphadenopathy. Similar mild FDG avidity in the soft tissue around the biopsy clip in the left breast. Interval decreased in size and FDG avidity of a left axillary lymph node suggesting interval response to therapy.   24: [facility]: PET scan: Impression - Revisualized foci at the region of the cervix is largely unchanged, stable posttreatment changes at the left breast.   24: [facility]: US Breast Limited Left: Impression - Known biopsy proven left breast malignancy and malignant left axillary lymph node, both without signfiicant sonographic change.   10/11/24: [facility]: PET Scan: Impression - Since prior FDG-PET/CT scan 2024: 1.  Persistent focus at the region of the LEFT cervix remains grossly stable. Consider pelvic MRI for further evaluation if this will change patient management. Other findings in this area are currently unremarkable. 2.  Stable posttreatment changes at LEFT breast. 3.  Otherwise, no new suspicious findings elsewhere. Attention to follow-up.  24: [facility]: MRI Breast: Impression - Previously visualized left breast enhancement consistent with previously diagnosed malignancy is no longer definitively seen as compared to 2023 exam. Previously identified left breast satellite lesion central posterior location no longer identified. Findings are consistent with almost complete resolution by imaging. No new areas of suspicious enhancement.BI-RADS 6   25: [facility]: PET scan: Impression - posttreatment changes in the left breast, without FDG avid lymphadenopathy nor distant FDG avid disease, no abnormal FDG uptake in the cervix.